# Patient Record
Sex: MALE | Race: OTHER | HISPANIC OR LATINO | Employment: OTHER | URBAN - METROPOLITAN AREA
[De-identification: names, ages, dates, MRNs, and addresses within clinical notes are randomized per-mention and may not be internally consistent; named-entity substitution may affect disease eponyms.]

---

## 2018-07-25 ENCOUNTER — APPOINTMENT (EMERGENCY)
Dept: CT IMAGING | Facility: HOSPITAL | Age: 83
End: 2018-07-25
Payer: MEDICARE

## 2018-07-25 ENCOUNTER — HOSPITAL ENCOUNTER (EMERGENCY)
Facility: HOSPITAL | Age: 83
Discharge: HOME | End: 2018-07-25
Attending: EMERGENCY MEDICINE | Admitting: EMERGENCY MEDICINE
Payer: MEDICARE

## 2018-07-25 VITALS
RESPIRATION RATE: 18 BRPM | HEART RATE: 65 BPM | TEMPERATURE: 98.2 F | SYSTOLIC BLOOD PRESSURE: 129 MMHG | OXYGEN SATURATION: 96 % | DIASTOLIC BLOOD PRESSURE: 69 MMHG

## 2018-07-25 DIAGNOSIS — N40.1 ENLARGED PROSTATE WITH URINARY RETENTION: Primary | ICD-10-CM

## 2018-07-25 DIAGNOSIS — R33.8 ENLARGED PROSTATE WITH URINARY RETENTION: Primary | ICD-10-CM

## 2018-07-25 LAB
ANION GAP SERPL CALCULATED.3IONS-SCNC: 9 MMOL/L (ref 4–13)
BACTERIA UR QL AUTO: ABNORMAL /HPF
BASOPHILS # BLD AUTO: 0.04 THOUSANDS/ΜL (ref 0–0.1)
BASOPHILS NFR BLD AUTO: 0 % (ref 0–1)
BILIRUB UR QL STRIP: NEGATIVE
BUN SERPL-MCNC: 21 MG/DL (ref 5–25)
CALCIUM SERPL-MCNC: 9.3 MG/DL (ref 8.3–10.1)
CHLORIDE SERPL-SCNC: 104 MMOL/L (ref 100–108)
CLARITY UR: CLEAR
CO2 SERPL-SCNC: 29 MMOL/L (ref 21–32)
COLOR UR: YELLOW
CREAT SERPL-MCNC: 1.43 MG/DL (ref 0.6–1.3)
EOSINOPHIL # BLD AUTO: 0.21 THOUSAND/ΜL (ref 0–0.61)
EOSINOPHIL NFR BLD AUTO: 2 % (ref 0–6)
ERYTHROCYTE [DISTWIDTH] IN BLOOD BY AUTOMATED COUNT: 13.5 % (ref 11.6–15.1)
GFR SERPL CREATININE-BSD FRML MDRD: 46 ML/MIN/1.73SQ M
GLUCOSE SERPL-MCNC: 112 MG/DL (ref 65–140)
GLUCOSE UR STRIP-MCNC: NEGATIVE MG/DL
HCT VFR BLD AUTO: 39.4 % (ref 36.5–49.3)
HGB BLD-MCNC: 13 G/DL (ref 12–17)
HGB UR QL STRIP.AUTO: ABNORMAL
IMM GRANULOCYTES # BLD AUTO: 0.02 THOUSAND/UL (ref 0–0.2)
IMM GRANULOCYTES NFR BLD AUTO: 0 % (ref 0–2)
KETONES UR STRIP-MCNC: NEGATIVE MG/DL
LEUKOCYTE ESTERASE UR QL STRIP: NEGATIVE
LYMPHOCYTES # BLD AUTO: 1.38 THOUSANDS/ΜL (ref 0.6–4.47)
LYMPHOCYTES NFR BLD AUTO: 15 % (ref 14–44)
MCH RBC QN AUTO: 27.5 PG (ref 26.8–34.3)
MCHC RBC AUTO-ENTMCNC: 33 G/DL (ref 31.4–37.4)
MCV RBC AUTO: 84 FL (ref 82–98)
MONOCYTES # BLD AUTO: 0.64 THOUSAND/ΜL (ref 0.17–1.22)
MONOCYTES NFR BLD AUTO: 7 % (ref 4–12)
MUCOUS THREADS UR QL AUTO: ABNORMAL
NEUTROPHILS # BLD AUTO: 6.77 THOUSANDS/ΜL (ref 1.85–7.62)
NEUTS SEG NFR BLD AUTO: 76 % (ref 43–75)
NITRITE UR QL STRIP: NEGATIVE
NON-SQ EPI CELLS URNS QL MICRO: ABNORMAL /HPF
NRBC BLD AUTO-RTO: 0 /100 WBCS
PH UR STRIP.AUTO: 5.5 [PH] (ref 4.5–8)
PLATELET # BLD AUTO: 248 THOUSANDS/UL (ref 149–390)
PMV BLD AUTO: 9.6 FL (ref 8.9–12.7)
POTASSIUM SERPL-SCNC: 3.6 MMOL/L (ref 3.5–5.3)
PROT UR STRIP-MCNC: NEGATIVE MG/DL
RBC # BLD AUTO: 4.72 MILLION/UL (ref 3.88–5.62)
RBC #/AREA URNS AUTO: ABNORMAL /HPF
SODIUM SERPL-SCNC: 142 MMOL/L (ref 136–145)
SP GR UR STRIP.AUTO: >=1.03 (ref 1–1.03)
UROBILINOGEN UR QL STRIP.AUTO: 0.2 E.U./DL
WBC # BLD AUTO: 9.06 THOUSAND/UL (ref 4.31–10.16)
WBC #/AREA URNS AUTO: ABNORMAL /HPF

## 2018-07-25 PROCEDURE — 80048 BASIC METABOLIC PNL TOTAL CA: CPT | Performed by: EMERGENCY MEDICINE

## 2018-07-25 PROCEDURE — 99284 EMERGENCY DEPT VISIT MOD MDM: CPT

## 2018-07-25 PROCEDURE — 51798 US URINE CAPACITY MEASURE: CPT

## 2018-07-25 PROCEDURE — 81001 URINALYSIS AUTO W/SCOPE: CPT | Performed by: EMERGENCY MEDICINE

## 2018-07-25 PROCEDURE — 36415 COLL VENOUS BLD VENIPUNCTURE: CPT | Performed by: EMERGENCY MEDICINE

## 2018-07-25 PROCEDURE — 96360 HYDRATION IV INFUSION INIT: CPT

## 2018-07-25 PROCEDURE — 85025 COMPLETE CBC W/AUTO DIFF WBC: CPT | Performed by: EMERGENCY MEDICINE

## 2018-07-25 PROCEDURE — 74177 CT ABD & PELVIS W/CONTRAST: CPT

## 2018-07-25 RX ORDER — TAMSULOSIN HYDROCHLORIDE 0.4 MG/1
0.4 CAPSULE ORAL
Qty: 14 CAPSULE | Refills: 0 | Status: SHIPPED | OUTPATIENT
Start: 2018-07-25 | End: 2019-08-03

## 2018-07-25 RX ORDER — RISPERIDONE 0.5 MG/1
0.5 TABLET, FILM COATED ORAL 2 TIMES DAILY
COMMUNITY
End: 2022-04-04

## 2018-07-25 RX ORDER — HYDROCHLOROTHIAZIDE 25 MG/1
25 TABLET ORAL DAILY
COMMUNITY
End: 2022-04-04

## 2018-07-25 RX ORDER — LISINOPRIL 20 MG/1
20 TABLET ORAL DAILY
COMMUNITY
End: 2022-04-04

## 2018-07-25 RX ORDER — DONEPEZIL HYDROCHLORIDE 5 MG/1
5 TABLET, FILM COATED ORAL
COMMUNITY
End: 2021-08-18

## 2018-07-25 RX ORDER — CHOLECALCIFEROL (VITAMIN D3) 125 MCG
2000 CAPSULE ORAL DAILY
COMMUNITY

## 2018-07-25 RX ORDER — AMLODIPINE BESYLATE 10 MG/1
10 TABLET ORAL DAILY
COMMUNITY

## 2018-07-25 RX ORDER — ASPIRIN 81 MG/1
81 TABLET ORAL DAILY
COMMUNITY

## 2018-07-25 RX ADMIN — SODIUM CHLORIDE 500 ML: 0.9 INJECTION, SOLUTION INTRAVENOUS at 18:30

## 2018-07-25 RX ADMIN — IOHEXOL 100 ML: 350 INJECTION, SOLUTION INTRAVENOUS at 19:46

## 2018-07-25 NOTE — ED PROVIDER NOTES
History  Chief Complaint   Patient presents with    Abdominal Pain     Pt presents to ER via EMS from home, pt has language barrier & no family present upon arrival   Per EMS, pt has been c/o'ing of (R) sided "testicle" pain  Generally healthy appearing frail elderly male presents in no distress  HPI    Prior to Admission Medications   Prescriptions Last Dose Informant Patient Reported? Taking? Cholecalciferol (VITAMIN D3) 2000 units TABS  Child Yes Yes   Sig: Take 2,000 Units by mouth daily   amLODIPine (NORVASC) 10 mg tablet  Child Yes Yes   Sig: Take 10 mg by mouth daily   aspirin (ECOTRIN LOW STRENGTH) 81 mg EC tablet  Child Yes Yes   Sig: Take 81 mg by mouth daily   donepezil (ARICEPT) 5 mg tablet  Child Yes Yes   Sig: Take 5 mg by mouth daily at bedtime   hydrochlorothiazide (HYDRODIURIL) 25 mg tablet  Child Yes Yes   Sig: Take 25 mg by mouth daily   lisinopril (ZESTRIL) 20 mg tablet  Child Yes Yes   Sig: Take 20 mg by mouth daily   risperiDONE (RisperDAL) 0 5 mg tablet  Child Yes Yes   Sig: Take 0 5 mg by mouth 2 (two) times a day      Facility-Administered Medications: None       Past Medical History:   Diagnosis Date    Hypertension        No past surgical history on file  No family history on file  I have reviewed and agree with the history as documented  Social History   Substance Use Topics    Smoking status: Not on file    Smokeless tobacco: Not on file    Alcohol use Not on file        Review of Systems    Physical Exam  Physical Exam   Constitutional: He appears well-developed and well-nourished  No distress  HENT:   Head: Normocephalic and atraumatic  Mouth/Throat: Oropharynx is clear and moist    Eyes: Conjunctivae are normal  Pupils are equal, round, and reactive to light  Neck: Normal range of motion  No tracheal deviation present  Cardiovascular: Normal rate, regular rhythm, normal heart sounds and intact distal pulses      Pulmonary/Chest: Effort normal and breath sounds normal  No respiratory distress  Abdominal: Soft  Bowel sounds are normal  He exhibits no distension  There is tenderness (Mild, occasional, sometimes when palpated) in the right lower quadrant  There is no rigidity, no rebound, no guarding and no CVA tenderness  Hernia confirmed negative in the right inguinal area and confirmed negative in the left inguinal area  Genitourinary: Right testis shows no swelling and no tenderness  Left testis shows no swelling and no tenderness  Lymphadenopathy: No inguinal adenopathy noted on the right or left side  Neurological: He is alert  He is disoriented  GCS eye subscore is 4  GCS verbal subscore is 4  GCS motor subscore is 6  The oriented to self and family  Otherwise confused   Skin: Skin is warm and dry  Psychiatric: He has a normal mood and affect  His behavior is normal    Nursing note and vitals reviewed        Vital Signs  ED Triage Vitals [07/25/18 1627]   Temperature Pulse Respirations Blood Pressure SpO2   98 2 °F (36 8 °C) 67 18 122/72 95 %      Temp Source Heart Rate Source Patient Position - Orthostatic VS BP Location FiO2 (%)   Oral Monitor Lying Right arm --      Pain Score       No Pain           Vitals:    07/25/18 1627   BP: 122/72   Pulse: 67   Patient Position - Orthostatic VS: Lying       Visual Acuity      ED Medications  Medications   sodium chloride 0 9 % bolus 500 mL (500 mL Intravenous New Bag 7/25/18 1830)       Diagnostic Studies  Results Reviewed     Procedure Component Value Units Date/Time    Basic metabolic panel [[id number]]  (Abnormal) Collected:  07/25/18 1740    Lab Status:  Final result Specimen:  Blood from Arm, Right Updated:  07/25/18 1755     Sodium 142 mmol/L      Potassium 3 6 mmol/L      Chloride 104 mmol/L      CO2 29 mmol/L      Anion Gap 9 mmol/L      BUN 21 mg/dL      Creatinine 1 43 (H) mg/dL      Glucose 112 mg/dL      Calcium 9 3 mg/dL      eGFR 46 ml/min/1 73sq m     Narrative:         National Kidney Disease Education Program recommendations are as follows:  GFR calculation is accurate only with a steady state creatinine  Chronic Kidney disease less than 60 ml/min/1 73 sq  meters  Kidney failure less than 15 ml/min/1 73 sq  meters      Urine Microscopic [[id number]]  (Abnormal) Collected:  07/25/18 1731    Lab Status:  Final result Specimen:  Urine from Urine, Clean Catch Updated:  07/25/18 1745     RBC, UA 0-1 (A) /hpf      WBC, UA None Seen /hpf      Epithelial Cells Occasional /hpf      Bacteria, UA None Seen /hpf      MUCOUS THREADS Moderate (A)    CBC and differential [[id number]]  (Abnormal) Collected:  07/25/18 1740    Lab Status:  Final result Specimen:  Blood from Arm, Right Updated:  07/25/18 1744     WBC 9 06 Thousand/uL      RBC 4 72 Million/uL      Hemoglobin 13 0 g/dL      Hematocrit 39 4 %      MCV 84 fL      MCH 27 5 pg      MCHC 33 0 g/dL      RDW 13 5 %      MPV 9 6 fL      Platelets 412 Thousands/uL      nRBC 0 /100 WBCs      Neutrophils Relative 76 (H) %      Immat GRANS % 0 %      Lymphocytes Relative 15 %      Monocytes Relative 7 %      Eosinophils Relative 2 %      Basophils Relative 0 %      Neutrophils Absolute 6 77 Thousands/µL      Immature Grans Absolute 0 02 Thousand/uL      Lymphocytes Absolute 1 38 Thousands/µL      Monocytes Absolute 0 64 Thousand/µL      Eosinophils Absolute 0 21 Thousand/µL      Basophils Absolute 0 04 Thousands/µL     UA w Reflex to Microscopic w Reflex to Culture [[id number]]  (Abnormal) Collected:  07/25/18 1731    Lab Status:  Final result Specimen:  Urine from Urine, Clean Catch Updated:  07/25/18 1737     Color, UA Yellow     Clarity, UA Clear     Specific Gravity, UA >=1 030     pH, UA 5 5     Leukocytes, UA Negative     Nitrite, UA Negative     Protein, UA Negative mg/dl      Glucose, UA Negative mg/dl      Ketones, UA Negative mg/dl      Urobilinogen, UA 0 2 E U /dl      Bilirubin, UA Negative     Blood, UA Trace-Intact (A)                 CT abdomen pelvis with contrast    (Results Pending)              Procedures  Procedures       Phone Contacts  ED Phone Contact    ED Course                               MDM  Number of Diagnoses or Management Options  Diagnosis management comments: This is an 21-year-old male who presents here today with pain  History is severely limited, as the patient has dementia and currently has no complaints, and the daughter states that he is very forgetful and would not remember what was hurting him  Most of the history is provided by the daughter, but she is very vague and keeps changing the story  Due to limited English, the  was used via the [employer]  The daughter states that earlier today the patient was getting out of the shower and was complaining of right groin pain  Later she states that it is his left hip, and when asked to clarify she states that she RD stated it was his right groin  She says he was given Advil and this improved  He was not having any difficulties walking  He has not had any other complaints  He says the Advil helps his pain and he is no longer hurting  She keeps talking about how he had prostate surgery for cancer about 12 years ago  When asks if this has recurred she states he was recently started on a pill for prostate problems, but is uncertain of what or exactly why  He has not had any other abdominal surgeries  She states he sometimes gets a "bump" in his stomach that goes away, but he does not complain of pain with this  The patient and his daughter currently deny any redness, swelling, rashes, complaints of nausea, vomiting, diarrhea, dysuria or other urinary symptoms  He denies any recent falls or trauma  The patient has no complaints and is not certain why he is here  He is also not sure where he currently is  ROS: Otherwise negative, unless stated as above  He is well-appearing, in no acute distress   Sometimes, he complains of mild tenderness with palpation to the right lower quadrant, but not every time this area is palpated  There is no groin tenderness, scrotal tenderness, hernias, visible erythema or rashes to this area  There is no hip tenderness or pain with range of motion of either hip  The remainder of his exam is unremarkable  Given the vague history and inconsistent exam, is uncertain of exactly where he is hurting or what his underlying problem is  This could be either primary intra-abdominal pathology, scrotal or testicular problem, UTI  Given this, and his history of prostate cancer, we will check lab work, his urine, and CT scan to evaluate  His lab work is unremarkable  His CT scan is still pending  Care patient transferred to Dr Eugenio Heart to follow-up on CT scan and disposition as appropriate  If there are no acute abnormalities on the CT scan, he is stable for discharge home for follow up with his primary care doctor  Amount and/or Complexity of Data Reviewed  Clinical lab tests: ordered and reviewed  Tests in the radiology section of CPT®: ordered and reviewed  Obtain history from someone other than the patient: yes (Daughter)  Independent visualization of images, tracings, or specimens: yes      CritCare Time    Disposition  Final diagnoses:   None     ED Disposition     None      Follow-up Information    None         Patient's Medications   Discharge Prescriptions    No medications on file     No discharge procedures on file      ED Provider  Electronically Signed by           Rod Bettencourt MD  07/25/18 4699

## 2018-07-26 NOTE — DISCHARGE INSTRUCTIONS
Hipertrofia prostática benigna   LO QUE NECESITA SABER:   La hipertrofia prostática benigna (HPB) es arias condición que provoca que la glándula prostática crezca mas zachariah de lo normal  La glándula prostática es la glándula sexual masculina que produce el líquido que es parte del semen  Tiene el tamaño aproximadamente de [place] y está localizado debajo de la vejiga  Conforme la próstata crece, puede apretar la uretra  [place] puede obstruir el flujo de [place] y provocar problemas urinarios  INSTRUCCIONES SOBRE EL [facility]:   Medicamentos:   · Bloqueante savage:  Estos medicamentos relajan los músculos de la próstata y de la vejiga  Podrían ayudarlo a orinar con mas facilidad  · Inhibidores de la 5 savgae reductasa:  Estos medicamentos obstruyen la producción de raias hormona que provoca que la próstata se agrande mas  Podrían ayudar a disminuir el crecimiento de la próstata o a encogerla  · [place] carlene medicamentos ellie se le haya indicado  Consulte con macias médico si usted denzel que macias medicamento no le está ayudando o si presenta efectos secundarios  Infórmele si es alérgico a algún medicamento  Mantenga arias lista actualizada de los [place], las vitaminas y los productos herbales que romero  Incluya los siguientes datos de los medicamentos: cantidad, frecuencia y motivo de administración  Traiga con usted la lista o los envases de la píldoras a carlene citas de seguimiento  Lleve la lista de los medicamentos con usted en sheryl de arias emergencia  Acuda a carlene consultas de control con macias médico según le indicaron  Anote carlene preguntas para que se acuerde de hacerlas elenita carlene visitas  Controle la HPB:   · No permita que macias vejiga se llene demasiado antes de vaciarla  Orine cuando sienta el impulso de [place]  · Limite el consumo de alcohol  No ingiera grandes cantidades de líquidos de [employer]  · Brianafurt cantidad de sal que consume   Por ejemplo, las toni fritas, las alan curadas y las sopas enlatadas  No use sal de lucero  · Los médicos podrían indicarle que no consuma alimentos picantes, ellie ASKER  [place] podría ayudarlo a determinar si la comida picante empeora los síntomas de HPB  · Usted puede tener relaciones sexuales si se siente vargas  Pregúntele a rodriguez [place] vitaminas y minerales son adecuados para usted  · Hay arias cantidad zachariah de serafin en la orina  · Viviane signos y síntomas empeoran  · Usted tiene fiebre  · Usted tiene preguntas o inquietudes acerca de rodriguez condición o cuidado  Regrese a la magno de emergencias si:   · Usted no puede orinar  · Siente que la vejiga está muy llena y tiene dolor  © 2017 [address] Information is for End User's use only and may not be sold, redistributed or otherwise used for commercial purposes  All illustrations and images included in CareNotes® are the copyrighted property of A D A M , Inc  or Kirill Dean  Esta información es sólo para uso en educación  Rodriguez intención no es darle un consejo médico sobre enfermedades o tratamientos  Colsulte con rodriguez Kacie Jerry farmacéutico antes de seguir cualquier régimen médico para saber si es seguro y efectivo para usted

## 2018-09-13 ENCOUNTER — TELEPHONE (OUTPATIENT)
Dept: UROLOGY | Facility: MEDICAL CENTER | Age: 83
End: 2018-09-13

## 2019-02-20 ENCOUNTER — HOSPITAL ENCOUNTER (EMERGENCY)
Facility: HOSPITAL | Age: 84
Discharge: HOME | End: 2019-02-21
Attending: EMERGENCY MEDICINE | Admitting: EMERGENCY MEDICINE
Payer: MEDICARE

## 2019-02-20 DIAGNOSIS — M54.50 ACUTE LOW BACK PAIN: Primary | ICD-10-CM

## 2019-02-20 DIAGNOSIS — N40.0 ENLARGED PROSTATE: ICD-10-CM

## 2019-02-20 DIAGNOSIS — M54.9 BACK PAIN: ICD-10-CM

## 2019-02-20 LAB
BASOPHILS # BLD AUTO: 0.02 THOUSANDS/ΜL (ref 0–0.1)
BASOPHILS NFR BLD AUTO: 0 % (ref 0–1)
EOSINOPHIL # BLD AUTO: 0.01 THOUSAND/ΜL (ref 0–0.61)
EOSINOPHIL NFR BLD AUTO: 0 % (ref 0–6)
ERYTHROCYTE [DISTWIDTH] IN BLOOD BY AUTOMATED COUNT: 13.7 % (ref 11.6–15.1)
HCT VFR BLD AUTO: 40.4 % (ref 36.5–49.3)
HGB BLD-MCNC: 13.3 G/DL (ref 12–17)
IMM GRANULOCYTES # BLD AUTO: 0.09 THOUSAND/UL (ref 0–0.2)
IMM GRANULOCYTES NFR BLD AUTO: 1 % (ref 0–2)
LYMPHOCYTES # BLD AUTO: 1.11 THOUSANDS/ΜL (ref 0.6–4.47)
LYMPHOCYTES NFR BLD AUTO: 6 % (ref 14–44)
MCH RBC QN AUTO: 27 PG (ref 26.8–34.3)
MCHC RBC AUTO-ENTMCNC: 32.9 G/DL (ref 31.4–37.4)
MCV RBC AUTO: 82 FL (ref 82–98)
MONOCYTES # BLD AUTO: 1.23 THOUSAND/ΜL (ref 0.17–1.22)
MONOCYTES NFR BLD AUTO: 7 % (ref 4–12)
NEUTROPHILS # BLD AUTO: 14.9 THOUSANDS/ΜL (ref 1.85–7.62)
NEUTS SEG NFR BLD AUTO: 86 % (ref 43–75)
NRBC BLD AUTO-RTO: 0 /100 WBCS
PLATELET # BLD AUTO: 282 THOUSANDS/UL (ref 149–390)
PMV BLD AUTO: 10.7 FL (ref 8.9–12.7)
RBC # BLD AUTO: 4.92 MILLION/UL (ref 3.88–5.62)
WBC # BLD AUTO: 17.36 THOUSAND/UL (ref 4.31–10.16)

## 2019-02-20 PROCEDURE — 99284 EMERGENCY DEPT VISIT MOD MDM: CPT

## 2019-02-20 PROCEDURE — 96374 THER/PROPH/DIAG INJ IV PUSH: CPT

## 2019-02-20 PROCEDURE — 36415 COLL VENOUS BLD VENIPUNCTURE: CPT | Performed by: EMERGENCY MEDICINE

## 2019-02-20 PROCEDURE — 83690 ASSAY OF LIPASE: CPT | Performed by: EMERGENCY MEDICINE

## 2019-02-20 PROCEDURE — 83605 ASSAY OF LACTIC ACID: CPT | Performed by: EMERGENCY MEDICINE

## 2019-02-20 PROCEDURE — 85025 COMPLETE CBC W/AUTO DIFF WBC: CPT | Performed by: EMERGENCY MEDICINE

## 2019-02-20 PROCEDURE — 80053 COMPREHEN METABOLIC PANEL: CPT | Performed by: EMERGENCY MEDICINE

## 2019-02-20 PROCEDURE — 84484 ASSAY OF TROPONIN QUANT: CPT | Performed by: EMERGENCY MEDICINE

## 2019-02-20 RX ORDER — ACETAMINOPHEN 325 MG/1
975 TABLET ORAL ONCE
Status: COMPLETED | OUTPATIENT
Start: 2019-02-21 | End: 2019-02-20

## 2019-02-20 RX ORDER — ONDANSETRON 2 MG/ML
4 INJECTION INTRAMUSCULAR; INTRAVENOUS ONCE
Status: COMPLETED | OUTPATIENT
Start: 2019-02-20 | End: 2019-02-20

## 2019-02-20 RX ADMIN — ONDANSETRON 4 MG: 2 INJECTION INTRAMUSCULAR; INTRAVENOUS at 23:43

## 2019-02-20 RX ADMIN — ACETAMINOPHEN 975 MG: 325 TABLET, FILM COATED ORAL at 23:57

## 2019-02-21 ENCOUNTER — APPOINTMENT (EMERGENCY)
Dept: CT IMAGING | Facility: HOSPITAL | Age: 84
End: 2019-02-21
Payer: MEDICARE

## 2019-02-21 VITALS
HEIGHT: 65 IN | OXYGEN SATURATION: 95 % | DIASTOLIC BLOOD PRESSURE: 81 MMHG | SYSTOLIC BLOOD PRESSURE: 142 MMHG | TEMPERATURE: 98.2 F | BODY MASS INDEX: 28.49 KG/M2 | RESPIRATION RATE: 20 BRPM | WEIGHT: 171 LBS | HEART RATE: 91 BPM

## 2019-02-21 LAB
ALBUMIN SERPL BCP-MCNC: 4 G/DL (ref 3.5–5)
ALP SERPL-CCNC: 81 U/L (ref 46–116)
ALT SERPL W P-5'-P-CCNC: 18 U/L (ref 12–78)
ANION GAP SERPL CALCULATED.3IONS-SCNC: 12 MMOL/L (ref 4–13)
AST SERPL W P-5'-P-CCNC: 24 U/L (ref 5–45)
ATRIAL RATE: 81 BPM
BACTERIA UR QL AUTO: ABNORMAL /HPF
BILIRUB SERPL-MCNC: 0.5 MG/DL (ref 0.2–1)
BILIRUB UR QL STRIP: NEGATIVE
BUN SERPL-MCNC: 16 MG/DL (ref 5–25)
CALCIUM SERPL-MCNC: 9.9 MG/DL (ref 8.3–10.1)
CHLORIDE SERPL-SCNC: 96 MMOL/L (ref 100–108)
CLARITY UR: CLEAR
CO2 SERPL-SCNC: 28 MMOL/L (ref 21–32)
COLOR UR: YELLOW
CREAT SERPL-MCNC: 1.33 MG/DL (ref 0.6–1.3)
GFR SERPL CREATININE-BSD FRML MDRD: 49 ML/MIN/1.73SQ M
GLUCOSE SERPL-MCNC: 173 MG/DL (ref 65–140)
GLUCOSE UR STRIP-MCNC: NEGATIVE MG/DL
HGB UR QL STRIP.AUTO: ABNORMAL
KETONES UR STRIP-MCNC: ABNORMAL MG/DL
LACTATE SERPL-SCNC: 1.6 MMOL/L (ref 0.5–2)
LEUKOCYTE ESTERASE UR QL STRIP: ABNORMAL
LIPASE SERPL-CCNC: 162 U/L (ref 73–393)
MUCOUS THREADS UR QL AUTO: ABNORMAL
NITRITE UR QL STRIP: NEGATIVE
NON-SQ EPI CELLS URNS QL MICRO: ABNORMAL /HPF
P AXIS: 37 DEGREES
PH UR STRIP.AUTO: 5.5 [PH] (ref 4.5–8)
POTASSIUM SERPL-SCNC: 3.5 MMOL/L (ref 3.5–5.3)
PR INTERVAL: 178 MS
PROT SERPL-MCNC: 9.2 G/DL (ref 6.4–8.2)
PROT UR STRIP-MCNC: NEGATIVE MG/DL
QRS AXIS: -33 DEGREES
QRSD INTERVAL: 92 MS
QT INTERVAL: 372 MS
QTC INTERVAL: 432 MS
RBC #/AREA URNS AUTO: ABNORMAL /HPF
SODIUM SERPL-SCNC: 136 MMOL/L (ref 136–145)
SP GR UR STRIP.AUTO: >=1.03 (ref 1–1.03)
T WAVE AXIS: 56 DEGREES
TROPONIN I SERPL-MCNC: <0.02 NG/ML
UROBILINOGEN UR QL STRIP.AUTO: 0.2 E.U./DL
VENTRICULAR RATE: 81 BPM
WBC #/AREA URNS AUTO: ABNORMAL /HPF

## 2019-02-21 PROCEDURE — 71260 CT THORAX DX C+: CPT

## 2019-02-21 PROCEDURE — 93005 ELECTROCARDIOGRAM TRACING: CPT

## 2019-02-21 PROCEDURE — 93010 ELECTROCARDIOGRAM REPORT: CPT | Performed by: INTERNAL MEDICINE

## 2019-02-21 PROCEDURE — 81001 URINALYSIS AUTO W/SCOPE: CPT | Performed by: EMERGENCY MEDICINE

## 2019-02-21 PROCEDURE — 74177 CT ABD & PELVIS W/CONTRAST: CPT

## 2019-02-21 RX ORDER — LIDOCAINE 50 MG/G
1 PATCH TOPICAL DAILY
Qty: 30 PATCH | Refills: 0 | Status: SHIPPED | OUTPATIENT
Start: 2019-02-21 | End: 2021-08-18

## 2019-02-21 RX ORDER — LIDOCAINE 50 MG/G
2 PATCH TOPICAL ONCE
Status: DISCONTINUED | OUTPATIENT
Start: 2019-02-21 | End: 2019-02-21 | Stop reason: HOSPADM

## 2019-02-21 RX ADMIN — IOHEXOL 100 ML: 350 INJECTION, SOLUTION INTRAVENOUS at 00:51

## 2019-02-21 RX ADMIN — LIDOCAINE 2 PATCH: 50 PATCH TOPICAL at 01:43

## 2019-02-21 NOTE — ED PROVIDER NOTES
History  Chief Complaint   Patient presents with    Back Pain     pt brought in by EMS for back pain and nausea; vomited food particles when the EMS arrived  History and examination completed with use of phone   Patient has severe dementia and does not appear to recall much of the history  Additional history provided by patient's family through the   53-year-old male presents with a reported 1 day of lower back or flank pain  Patient currently denies any back or flank pain  Per EMS, patient was noted to be nauseous and subsequently had 3 episodes of nonbloody, nonbilious en route to the emergency room  IV was established but they were unable to initiate ondansetron prior to arrival     Patient does not provide significant information regarding his history, he does affirm persistent nauseousness and recalls his vomiting  He does not recall any back pain or flank pain  Is unclear which side the patient noted pain as EMS states family noted right and presently they think it may have been left  Patient denies any pain presently  On review of systems, patient does note a loose tooth but denies any pain in the area  Patient and family deny any falls or trauma  They deny any recent illnesses  They deny any loss of bowel or bladder  The note patient has a history of prostatomegaly with remote history of prostate cancer  Patient takes tamsulosin for continued prostatomegaly  Impression and plan:  Back or flank pain with associated vomiting representing a broad differential   Medical decision making is complicated by patient's underlying dementia and limited information available from the patient's family  Patient currently denies any pain, notes continued nausea    Will treat patient symptomatically with ondansetron, obtain laboratory evaluation and CT imaging of patient's abdomen pelvis to evaluate for intra-abdominal pathology including potential for renal colic or occult fracture  Prior to Admission Medications   Prescriptions Last Dose Informant Patient Reported? Taking? Cholecalciferol (VITAMIN D3) 2000 units TABS  Child Yes No   Sig: Take 2,000 Units by mouth daily   amLODIPine (NORVASC) 10 mg tablet  Child Yes No   Sig: Take 10 mg by mouth daily   aspirin (ECOTRIN LOW STRENGTH) 81 mg EC tablet  Child Yes No   Sig: Take 81 mg by mouth daily   donepezil (ARICEPT) 5 mg tablet  Child Yes No   Sig: Take 5 mg by mouth daily at bedtime   hydrochlorothiazide (HYDRODIURIL) 25 mg tablet  Child Yes No   Sig: Take 25 mg by mouth daily   lisinopril (ZESTRIL) 20 mg tablet  Child Yes No   Sig: Take 20 mg by mouth daily   risperiDONE (RisperDAL) 0 5 mg tablet  Child Yes No   Sig: Take 0 5 mg by mouth 2 (two) times a day   tamsulosin (FLOMAX) 0 4 mg   No No   Sig: Take 1 capsule (0 4 mg total) by mouth daily with dinner for 14 days      Facility-Administered Medications: None       Past Medical History:   Diagnosis Date    Diabetes mellitus ([facility] )     Hypertension     Prostate CA ([facility] )        History reviewed  No pertinent surgical history  History reviewed  No pertinent family history  I have reviewed and agree with the history as documented  Social History     Tobacco Use    Smoking status: Never Smoker    Smokeless tobacco: Never Used   Substance Use Topics    Alcohol use: Never     Frequency: Never    Drug use: Not on file        Review of Systems    Physical Exam  Physical Exam   Constitutional: He appears well-developed and well-nourished  No distress  HENT:   Head: Normocephalic and atraumatic  Mouth/Throat: Oropharynx is clear and moist    Poor dentition with likely lower right incisor appears loose but intact  The there is no tooth on either side to secure the patient's tooth  Eyes: Pupils are equal, round, and reactive to light  Neck: Normal range of motion  Neck supple  Cardiovascular: Normal rate and regular rhythm     Pulmonary/Chest: Effort normal and breath sounds normal  No stridor  No respiratory distress  He has no wheezes  He has no rales  Abdominal: Soft  Bowel sounds are normal  He exhibits no distension and no mass  There is no tenderness  There is no rebound and no guarding  Musculoskeletal: He exhibits no tenderness  Back: Normal inspection without scoliosis or hyperkyphosis  ROM of spine is normal  Patient notes pain located in no discrete area, appears to migratory  Tenderness to palpation in no discrete location  Normal stand (S1) and sit (S3)  Gait is normal without assistance  Reflexes: patellar (L4) and ankle (S1) normal  Passive RoM of the knee without pain  Normal resistance dorsiflex great toes bilaterally (L5)  Sensation normal on the legs including the anterior medial thigh (L2), medial epicondyle femur (L3), medial malleolus (L4), dorsal 3rd MTP (L5), lateral calcaneus (S1), popliteal fossa (S2)  Normal SLR and cross leg raise  Neurological: He is alert  Skin: Skin is warm and dry  He is not diaphoretic  Psychiatric: Cognition and memory are impaired  Vitals reviewed        Vital Signs  ED Triage Vitals   Temperature Pulse Respirations Blood Pressure SpO2   02/20/19 2332 02/20/19 2332 02/20/19 2332 02/20/19 2330 02/20/19 2332   98 2 °F (36 8 °C) 82 18 148/85 95 %      Temp Source Heart Rate Source Patient Position - Orthostatic VS BP Location FiO2 (%)   02/20/19 2332 02/20/19 2332 02/20/19 2332 02/20/19 2332 --   Oral Monitor Lying Right arm       Pain Score       02/20/19 2332       No Pain           Vitals:    02/20/19 2330 02/20/19 2332 02/21/19 0015   BP: 148/85 148/85 142/81   Pulse:  82 91   Patient Position - Orthostatic VS:  Lying        Visual Acuity      ED Medications  Medications   lidocaine (LIDODERM) 5 % patch 2 patch (2 patches Topical Medication Applied 2/21/19 0143)   ondansetron (ZOFRAN) injection 4 mg (4 mg Intravenous Given 2/20/19 2343)   acetaminophen (TYLENOL) tablet 975 mg (975 mg Oral Given 2/20/19 2357)   iohexol (OMNIPAQUE) 350 MG/ML injection (SINGLE-DOSE) 100 mL (100 mL Intravenous Given 2/21/19 0051)       Diagnostic Studies  Results Reviewed     Procedure Component Value Units Date/Time    Urine Microscopic [[id number]]  (Abnormal) Collected:  02/21/19 0052    Lab Status:  Final result Specimen:  Urine, Clean Catch Updated:  02/21/19 0109     RBC, UA 1-2 /hpf      WBC, UA 2-4 /hpf      Epithelial Cells Occasional /hpf      Bacteria, UA None Seen /hpf      MUCUS THREADS Moderate    UA w Reflex to Microscopic w Reflex to Culture [[id number]]  (Abnormal) Collected:  02/21/19 0052    Lab Status:  Final result Specimen:  Urine, Clean Catch Updated:  02/21/19 0057     Color, UA Yellow     Clarity, UA Clear     Specific Gravity, UA >=1 030     pH, UA 5 5     Leukocytes, UA Trace     Nitrite, UA Negative     Protein, UA Negative mg/dl      Glucose, UA Negative mg/dl      Ketones, UA Trace mg/dl      Urobilinogen, UA 0 2 E U /dl      Bilirubin, UA Negative     Blood, UA Small    Lactic acid, plasma [[id number]]  (Normal) Collected:  02/20/19 2350    Lab Status:  Final result Specimen:  Blood from Arm, Left Updated:  02/21/19 0020     LACTIC ACID 1 6 mmol/L     Narrative:       Result may be elevated if tourniquet was used during collection      Troponin I [[id number]]  (Normal) Collected:  02/20/19 2350    Lab Status:  Final result Specimen:  Blood from Arm, Left Updated:  02/21/19 0020     Troponin I <0 02 ng/mL     CMP [[id number]]  (Abnormal) Collected:  02/20/19 2350    Lab Status:  Final result Specimen:  Blood from Arm, Left Updated:  02/21/19 0014     Sodium 136 mmol/L      Potassium 3 5 mmol/L      Chloride 96 mmol/L      CO2 28 mmol/L      ANION GAP 12 mmol/L      BUN 16 mg/dL      Creatinine 1 33 mg/dL      Glucose 173 mg/dL      Calcium 9 9 mg/dL      AST 24 U/L      ALT 18 U/L      Alkaline Phosphatase 81 U/L      Total Protein 9 2 g/dL      Albumin 4 0 g/dL      Total Bilirubin 0 50 mg/dL      eGFR 49 ml/min/1 73sq m     Narrative:       National Kidney Disease Education Program recommendations are as follows:  GFR calculation is accurate only with a steady state creatinine  Chronic Kidney disease less than 60 ml/min/1 73 sq  meters  Kidney failure less than 15 ml/min/1 73 sq  meters  Lipase [[id number]]  (Normal) Collected:  02/20/19 2350    Lab Status:  Final result Specimen:  Blood from Arm, Left Updated:  02/21/19 0014     Lipase 162 u/L     CBC and differential [[id number]]  (Abnormal) Collected:  02/20/19 2350    Lab Status:  Final result Specimen:  Blood from Arm, Left Updated:  02/20/19 2359     WBC 17 36 Thousand/uL      RBC 4 92 Million/uL      Hemoglobin 13 3 g/dL      Hematocrit 40 4 %      MCV 82 fL      MCH 27 0 pg      MCHC 32 9 g/dL      RDW 13 7 %      MPV 10 7 fL      Platelets 747 Thousands/uL      nRBC 0 /100 WBCs      Neutrophils Relative 86 %      Immat GRANS % 1 %      Lymphocytes Relative 6 %      Monocytes Relative 7 %      Eosinophils Relative 0 %      Basophils Relative 0 %      Neutrophils Absolute 14 90 Thousands/µL      Immature Grans Absolute 0 09 Thousand/uL      Lymphocytes Absolute 1 11 Thousands/µL      Monocytes Absolute 1 23 Thousand/µL      Eosinophils Absolute 0 01 Thousand/µL      Basophils Absolute 0 02 Thousands/µL                  CT chest abdomen pelvis w contrast   Final Result by Radha Garrison MD (02/21 0105)      1  Marked thickening of the urinary bladder wall, correlate with urinalysis for cystitis  2   Enlarged prostate  3   Coronary artery calcifications  4   Stable 1 5 cm indeterminate adrenal nodule  5   Small hiatal hernia  Workstation performed: [id number]         CT recon only thoracolumbar   Final Result by Radha Garrison MD (02/21 0105)      No acute fracture or traumatic subluxation        Workstation performed: [id number]                    Procedures  Procedures       Phone Contacts  ED Phone Contact    ED Course  ED Course as of Feb 21 2374   Thu Feb 21, 2019   0025 EKG demonstrates normal sinus rhythm with no acute ST segment changes  1564 Could be secondary to vomiting, awaiting urinalysis and CT imaging to evaluate for potential source of infection  WBC(!): 17 36   0126 Patient's CT without any acute findings  Patient has known prostatomegaly  Patient for cystitis with no bacteria seen on urinalysis and patient again denies any dysuria or other urinary symptoms making this less likely  Patient afebrile, moderate leukocytosis that may be secondary to vomiting  Will p o  Challenge patient and attempted ambulation  Patient states he does not use a cane or walker at home  0134 Patient urinated without difficulty in the emergency room  0150 Again confirmed patient has no signs or symptoms of cauda equina  I discussed these in detail with the patient and his family  Patient's grandson speak English, explained the symptoms to him and I discussed with the patient's daughter on the  line are discharge in detail  I discussed follow-up and return precautions in detail  Discussed continued symptomatic management  Discussed signs and symptoms or returning to emergency room immediately  Discussed contacting primary care tomorrow for follow-up and reassessment  Discussed return to emergency room with any inability to ambulate  Patient has been ambulating throughout the emergency room with no difficulty and without assistance  Patient tolerated oral intake no additional episodes of vomiting  Discussed that I have no clear etiology for the symptoms and to have a low threshold to return to emergency room and to follow closely with primary care  Patient and family in agreement with this plan  Patient asymptomatic upon discharge the emergency room                                    MDM    Disposition  Final diagnoses:   Acute low back pain   Enlarged prostate   Back pain     Time reflects when diagnosis was documented in both MDM as applicable and the Disposition within this note     Time User Action Codes Description Comment    2/21/2019  1:34 AM Jethro Hind Add [M54 5] Acute low back pain     2/21/2019  1:49 AM Jethro Hind Add [N40 0] Enlarged prostate     2/21/2019  1:49 AM Jethro Hind Add [M54 9] Back pain       ED Disposition     ED Disposition Condition Date/Time Comment    Discharge Stable Thu Feb 21, 2019  1:49 AM Taylor Barclay discharge to home/self care  Follow-up Information     Follow up With Specialties Details Why Contact Info Additional Information    Dearchio Hylton PA-C Physician Assistant Schedule an appointment as soon as possible for a visit in 3 days Follow up and reassessment   [address] [facility] Emergency Department Emergency Medicine Go to  If symptoms worsen [address] [facility] [address] [phone] [place] [facility], [address] [place], [place], [address]          Discharge Medication List as of 2/21/2019  1:50 AM      START taking these medications    Details   lidocaine (LIDODERM) 5 % Apply 1 patch topically daily Remove & Discard patch within 12 hours or as directed by MD, Starting Thu 2/21/2019, Print         CONTINUE these medications which have NOT CHANGED    Details   amLODIPine (NORVASC) 10 mg tablet Take 10 mg by mouth daily, Historical Med      aspirin (ECOTRIN LOW STRENGTH) 81 mg EC tablet Take 81 mg by mouth daily, Historical Med      Cholecalciferol (VITAMIN D3) 2000 units TABS Take 2,000 Units by mouth daily, Historical Med      donepezil (ARICEPT) 5 mg tablet Take 5 mg by mouth daily at bedtime, Historical Med      hydrochlorothiazide (HYDRODIURIL) 25 mg tablet Take 25 mg by mouth daily, Historical Med      lisinopril (ZESTRIL) 20 mg tablet Take 20 mg by mouth daily, Historical Med      risperiDONE (RisperDAL) 0 5 mg tablet Take 0 5 mg by mouth 2 (two) times a day, Historical Med      tamsulosin (FLOMAX) 0 4 mg Take 1 capsule (0 4 mg total) by mouth daily with dinner for 14 days, Starting Wed 7/25/2018, Until Wed 8/8/2018, Print           No discharge procedures on file      ED Provider  Electronically Signed by           Kortney Angel MD  02/21/19 7270

## 2019-08-03 ENCOUNTER — APPOINTMENT (EMERGENCY)
Dept: CT IMAGING | Facility: HOSPITAL | Age: 84
End: 2019-08-03
Payer: COMMERCIAL

## 2019-08-03 ENCOUNTER — HOSPITAL ENCOUNTER (EMERGENCY)
Facility: HOSPITAL | Age: 84
Discharge: HOME | End: 2019-08-03
Attending: EMERGENCY MEDICINE | Admitting: EMERGENCY MEDICINE
Payer: COMMERCIAL

## 2019-08-03 ENCOUNTER — APPOINTMENT (EMERGENCY)
Dept: RADIOLOGY | Facility: HOSPITAL | Age: 84
End: 2019-08-03
Payer: COMMERCIAL

## 2019-08-03 VITALS
DIASTOLIC BLOOD PRESSURE: 69 MMHG | TEMPERATURE: 98.7 F | RESPIRATION RATE: 17 BRPM | HEART RATE: 87 BPM | SYSTOLIC BLOOD PRESSURE: 117 MMHG | HEIGHT: 65 IN | BODY MASS INDEX: 25.45 KG/M2 | OXYGEN SATURATION: 94 % | WEIGHT: 152.78 LBS

## 2019-08-03 DIAGNOSIS — R07.9 CHEST PAIN: Primary | ICD-10-CM

## 2019-08-03 LAB
ALBUMIN SERPL BCP-MCNC: 3.8 G/DL (ref 3.5–5)
ALP SERPL-CCNC: 92 U/L (ref 46–116)
ALT SERPL W P-5'-P-CCNC: 14 U/L (ref 12–78)
ANION GAP SERPL CALCULATED.3IONS-SCNC: 10 MMOL/L (ref 4–13)
AST SERPL W P-5'-P-CCNC: 17 U/L (ref 5–45)
ATRIAL RATE: 77 BPM
BASOPHILS # BLD AUTO: 0.02 THOUSANDS/ΜL (ref 0–0.1)
BASOPHILS NFR BLD AUTO: 0 % (ref 0–1)
BILIRUB SERPL-MCNC: 0.4 MG/DL (ref 0.2–1)
BUN SERPL-MCNC: 27 MG/DL (ref 5–25)
CALCIUM SERPL-MCNC: 10 MG/DL (ref 8.3–10.1)
CHLORIDE SERPL-SCNC: 99 MMOL/L (ref 100–108)
CO2 SERPL-SCNC: 32 MMOL/L (ref 21–32)
CREAT SERPL-MCNC: 1.53 MG/DL (ref 0.6–1.3)
EOSINOPHIL # BLD AUTO: 0.22 THOUSAND/ΜL (ref 0–0.61)
EOSINOPHIL NFR BLD AUTO: 2 % (ref 0–6)
ERYTHROCYTE [DISTWIDTH] IN BLOOD BY AUTOMATED COUNT: 14.2 % (ref 11.6–15.1)
GFR SERPL CREATININE-BSD FRML MDRD: 42 ML/MIN/1.73SQ M
GLUCOSE SERPL-MCNC: 151 MG/DL (ref 65–140)
HCT VFR BLD AUTO: 35.4 % (ref 36.5–49.3)
HGB BLD-MCNC: 11.6 G/DL (ref 12–17)
IMM GRANULOCYTES # BLD AUTO: 0.03 THOUSAND/UL (ref 0–0.2)
IMM GRANULOCYTES NFR BLD AUTO: 0 % (ref 0–2)
LIPASE SERPL-CCNC: 137 U/L (ref 73–393)
LYMPHOCYTES # BLD AUTO: 2.16 THOUSANDS/ΜL (ref 0.6–4.47)
LYMPHOCYTES NFR BLD AUTO: 23 % (ref 14–44)
MCH RBC QN AUTO: 26.8 PG (ref 26.8–34.3)
MCHC RBC AUTO-ENTMCNC: 32.8 G/DL (ref 31.4–37.4)
MCV RBC AUTO: 82 FL (ref 82–98)
MONOCYTES # BLD AUTO: 0.81 THOUSAND/ΜL (ref 0.17–1.22)
MONOCYTES NFR BLD AUTO: 9 % (ref 4–12)
NEUTROPHILS # BLD AUTO: 6.27 THOUSANDS/ΜL (ref 1.85–7.62)
NEUTS SEG NFR BLD AUTO: 66 % (ref 43–75)
NRBC BLD AUTO-RTO: 0 /100 WBCS
P AXIS: 51 DEGREES
PLATELET # BLD AUTO: 297 THOUSANDS/UL (ref 149–390)
PMV BLD AUTO: 10.1 FL (ref 8.9–12.7)
POTASSIUM SERPL-SCNC: 3.1 MMOL/L (ref 3.5–5.3)
PR INTERVAL: 192 MS
PROT SERPL-MCNC: 9.1 G/DL (ref 6.4–8.2)
QRS AXIS: 173 DEGREES
QRSD INTERVAL: 86 MS
QT INTERVAL: 394 MS
QTC INTERVAL: 445 MS
RBC # BLD AUTO: 4.33 MILLION/UL (ref 3.88–5.62)
SODIUM SERPL-SCNC: 141 MMOL/L (ref 136–145)
T WAVE AXIS: 45 DEGREES
TROPONIN I SERPL-MCNC: <0.02 NG/ML
VENTRICULAR RATE: 77 BPM
WBC # BLD AUTO: 9.51 THOUSAND/UL (ref 4.31–10.16)

## 2019-08-03 PROCEDURE — 99285 EMERGENCY DEPT VISIT HI MDM: CPT

## 2019-08-03 PROCEDURE — 80053 COMPREHEN METABOLIC PANEL: CPT | Performed by: PHYSICIAN ASSISTANT

## 2019-08-03 PROCEDURE — 99284 EMERGENCY DEPT VISIT MOD MDM: CPT | Performed by: EMERGENCY MEDICINE

## 2019-08-03 PROCEDURE — 85025 COMPLETE CBC W/AUTO DIFF WBC: CPT | Performed by: PHYSICIAN ASSISTANT

## 2019-08-03 PROCEDURE — 71046 X-RAY EXAM CHEST 2 VIEWS: CPT

## 2019-08-03 PROCEDURE — 96360 HYDRATION IV INFUSION INIT: CPT

## 2019-08-03 PROCEDURE — 36415 COLL VENOUS BLD VENIPUNCTURE: CPT | Performed by: PHYSICIAN ASSISTANT

## 2019-08-03 PROCEDURE — 96361 HYDRATE IV INFUSION ADD-ON: CPT

## 2019-08-03 PROCEDURE — 93005 ELECTROCARDIOGRAM TRACING: CPT

## 2019-08-03 PROCEDURE — 93010 ELECTROCARDIOGRAM REPORT: CPT | Performed by: INTERNAL MEDICINE

## 2019-08-03 PROCEDURE — 71275 CT ANGIOGRAPHY CHEST: CPT

## 2019-08-03 PROCEDURE — 74175 CTA ABDOMEN W/CONTRAST: CPT

## 2019-08-03 PROCEDURE — 83690 ASSAY OF LIPASE: CPT | Performed by: PHYSICIAN ASSISTANT

## 2019-08-03 PROCEDURE — 84484 ASSAY OF TROPONIN QUANT: CPT | Performed by: PHYSICIAN ASSISTANT

## 2019-08-03 RX ORDER — 0.9 % SODIUM CHLORIDE 0.9 %
3 VIAL (ML) INJECTION AS NEEDED
Status: DISCONTINUED | OUTPATIENT
Start: 2019-08-03 | End: 2019-08-03 | Stop reason: HOSPADM

## 2019-08-03 RX ADMIN — IOHEXOL 100 ML: 350 INJECTION, SOLUTION INTRAVENOUS at 02:48

## 2019-08-03 RX ADMIN — SODIUM CHLORIDE 1000 ML: 0.9 INJECTION, SOLUTION INTRAVENOUS at 01:51

## 2019-08-03 NOTE — ED PROVIDER NOTES
History  Chief Complaint   Patient presents with    Abdominal Pain     pt brought in for EMS for chest pain which has since subsided per EMS report  pt states having RUQ pain     Patient is an 37-year-old male, [place]-speaking, with history of hypertension, dyslipidemia that presents emergency department with complaints of episode of chest pain and abdominal pain  History is obtained from the grandson who acts as   He states that the patient was sitting down and when he stood up he had left-sided chest pain  He states lasted for approximately 10 minutes and then resolved completely  He denies any nausea, vomiting, diaphoresis, shortness of breath  He also states he has abdominal pain  He denies any diarrhea  Prior to Admission Medications   Prescriptions Last Dose Informant Patient Reported? Taking? Cholecalciferol (VITAMIN D3) 2000 units TABS  Child Yes No   Sig: Take 2,000 Units by mouth daily   amLODIPine (NORVASC) 10 mg tablet  Child Yes No   Sig: Take 10 mg by mouth daily   aspirin (ECOTRIN LOW STRENGTH) 81 mg EC tablet  Child Yes No   Sig: Take 81 mg by mouth daily   donepezil (ARICEPT) 5 mg tablet  Child Yes No   Sig: Take 5 mg by mouth daily at bedtime   hydrochlorothiazide (HYDRODIURIL) 25 mg tablet  Child Yes No   Sig: Take 25 mg by mouth daily   lidocaine (LIDODERM) 5 %   No No   Sig: Apply 1 patch topically daily Remove & Discard patch within 12 hours or as directed by MD   lisinopril (ZESTRIL) 20 mg tablet  Child Yes No   Sig: Take 20 mg by mouth daily   risperiDONE (RisperDAL) 0 5 mg tablet  Child Yes No   Sig: Take 0 5 mg by mouth 2 (two) times a day      Facility-Administered Medications: None       Past Medical History:   Diagnosis Date    [facility]     Hypertension     Prostate CA ([facility] )        History reviewed  No pertinent surgical history  History reviewed  No pertinent family history    I have reviewed and agree with the history as documented  Social History     Tobacco Use    Smoking status: Never Smoker    Smokeless tobacco: Never Used   Substance Use Topics    Alcohol use: Never     Frequency: Never    Drug use: Not on file        Review of Systems   Constitutional: Negative for fever  Respiratory: Negative for shortness of breath  Cardiovascular: Positive for chest pain  Gastrointestinal: Positive for abdominal pain  All other systems reviewed and are negative  Physical Exam  Physical Exam   Constitutional: He appears well-developed and well-nourished  HENT:   Head: Normocephalic and atraumatic  Eyes: Pupils are equal, round, and reactive to light  EOM are normal    Cardiovascular: Normal rate, regular rhythm and normal heart sounds  Pulmonary/Chest: Effort normal and breath sounds normal    Abdominal: Normal appearance and bowel sounds are normal  There is generalized tenderness  Neurological: He is alert  Psychiatric: He has a normal mood and affect  His behavior is normal    Vitals reviewed        Vital Signs  ED Triage Vitals [08/03/19 0029]   Temperature Pulse Respirations Blood Pressure SpO2   98 7 °F (37 1 °C) 80 18 105/67 98 %      Temp Source Heart Rate Source Patient Position - Orthostatic VS BP Location FiO2 (%)   Oral Monitor Lying Right arm --      Pain Score       --           Vitals:    08/03/19 0100 08/03/19 0145 08/03/19 0200 08/03/19 0300   BP: 105/66 137/77 101/68 117/69   Pulse: 77 81 78 87   Patient Position - Orthostatic VS: Lying Lying  Lying         Visual Acuity      ED Medications  Medications   sodium chloride 0 9 % bolus 1,000 mL (0 mL Intravenous Stopped 8/3/19 0326)   iohexol (OMNIPAQUE) 350 MG/ML injection (MULTI-DOSE) 100 mL (100 mL Intravenous Given 8/3/19 0248)       Diagnostic Studies  Results Reviewed     Procedure Component Value Units Date/Time    CMP [[id number]]  (Abnormal) Collected:  08/03/19 0100    Lab Status:  Final result Specimen:  Blood from Arm, Left Updated: 08/03/19 0129     Sodium 141 mmol/L      Potassium 3 1 mmol/L      Chloride 99 mmol/L      CO2 32 mmol/L      ANION GAP 10 mmol/L      BUN 27 mg/dL      Creatinine 1 53 mg/dL      Glucose 151 mg/dL      Calcium 10 0 mg/dL      AST 17 U/L      ALT 14 U/L      Alkaline Phosphatase 92 U/L      Total Protein 9 1 g/dL      Albumin 3 8 g/dL      Total Bilirubin 0 40 mg/dL      eGFR 42 ml/min/1 73sq m     Narrative:       National Kidney Disease Foundation guidelines for Chronic Kidney Disease (CKD):     Stage 1 with normal or high GFR (GFR > 90 mL/min/1 73 square meters)    Stage 2 Mild CKD (GFR = 60-89 mL/min/1 73 square meters)    Stage 3A Moderate CKD (GFR = 45-59 mL/min/1 73 square meters)    Stage 3B Moderate CKD (GFR = 30-44 mL/min/1 73 square meters)    Stage 4 Severe CKD (GFR = 15-29 mL/min/1 73 square meters)    Stage 5 End Stage CKD (GFR <15 mL/min/1 73 square meters)  Note: GFR calculation is accurate only with a steady state creatinine    Troponin I [[id number]]  (Normal) Collected:  08/03/19 0100    Lab Status:  Final result Specimen:  Blood from Arm, Left Updated:  08/03/19 0126     Troponin I <0 02 ng/mL     Lipase [[id number]]  (Normal) Collected:  08/03/19 0100    Lab Status:  Final result Specimen:  Blood from Arm, Left Updated:  08/03/19 0121     Lipase 137 u/L     CBC and differential [[id number]]  (Abnormal) Collected:  08/03/19 0100    Lab Status:  Final result Specimen:  Blood from Arm, Left Updated:  08/03/19 0107     WBC 9 51 Thousand/uL      RBC 4 33 Million/uL      Hemoglobin 11 6 g/dL      Hematocrit 35 4 %      MCV 82 fL      MCH 26 8 pg      MCHC 32 8 g/dL      RDW 14 2 %      MPV 10 1 fL      Platelets 779 Thousands/uL      nRBC 0 /100 WBCs      Neutrophils Relative 66 %      Immat GRANS % 0 %      Lymphocytes Relative 23 %      Monocytes Relative 9 %      Eosinophils Relative 2 %      Basophils Relative 0 %      Neutrophils Absolute 6 27 Thousands/µL      Immature Grans Absolute 0 03 Thousand/uL      Lymphocytes Absolute 2 16 Thousands/µL      Monocytes Absolute 0 81 Thousand/µL      Eosinophils Absolute 0 22 Thousand/µL      Basophils Absolute 0 02 Thousands/µL                  CTA dissection protocol chest and abdomen   Final Result by Denisse Yung DO (08/03 0256)      No evidence of aortic dissection or aneurysm  Workstation performed: [id number]         X-ray chest 2 views    (Results Pending)              Procedures  ECG 12 Lead Documentation Only  Date/Time: 8/3/2019 6:27 AM  Performed by: Elsie Clarke PA-C  Authorized by: Elsie Clarke PA-C     Indications / Diagnosis:  Chest pain  ECG reviewed by me, the ED Provider: yes    Patient location:  ED  Previous ECG:     Previous ECG:  Compared to current    Similarity:  No change  Interpretation:     Interpretation: normal    Rate:     ECG rate:  77    ECG rate assessment: normal    Rhythm:     Rhythm: sinus rhythm    Ectopy:     Ectopy: none    QRS:     QRS axis:  Normal    QRS intervals:  Normal  Conduction:     Conduction: normal    ST segments:     ST segments:  Normal  T waves:     T waves: normal             ED Course         HEART Risk Score      Most Recent Value   History  0 Filed at: 08/03/2019 0237   ECG  0 Filed at: 08/03/2019 1974   Age  2 Filed at: 08/03/2019 8168   Risk Factors  1 Filed at: 08/03/2019 0237   Troponin  0 Filed at: 08/03/2019 0237   Heart Score Risk Calculator   History  0 Filed at: 08/03/2019 0237   ECG  0 Filed at: 08/03/2019 9907   Age  2 Filed at: 08/03/2019 1604   Risk Factors  1 Filed at: 08/03/2019 0237   Troponin  0 Filed at: 08/03/2019 0237   HEART Score  3 Filed at: 08/03/2019 5160   HEART Score  3 Filed at: 08/03/2019 1704        Identification of Seniors at Risk      Most Recent Value   (ISAR) Identification of Seniors at Risk   Before the illness or injury that brought you to the Emergency, did you need someone to help you on a regular basis?   1 Filed at: 08/03/2019 0031   In the last 24 hours, have you needed more help than usual?  1 Filed at: 08/03/2019 0031   Have you been hospitalized for one or more nights during the past 6 months? 1 Filed at: 08/03/2019 0031   In general, do you see well?  0 Filed at: 08/03/2019 0031   In general, do you have serious problems with your memory? 0 Filed at: 08/03/2019 0031   Do you take more than three different medications every day? 1 Filed at: 08/03/2019 0031   ISAR Score  4 Filed at: 08/03/2019 0031                          MDM  Number of Diagnoses or Management Options  Chest pain:   Diagnosis management comments: Patient is an 72-year-old male presents emergency department complaints of chest pain and abdominal pain  Lab evaluation was performed does not show any abnormality  ACS workup was performed does not show any evidence of ACS  CTA chest abdomen pelvis for dissection study was performed does not show any abnormality  Findings were reviewed patient  He wishes to be discharged home  Return parameters were discussed  Patient stable for discharge  Amount and/or Complexity of Data Reviewed  Clinical lab tests: ordered and reviewed  Tests in the radiology section of CPT®: ordered and reviewed  Obtain history from someone other than the patient: yes    Risk of Complications, Morbidity, and/or Mortality  Presenting problems: moderate  Diagnostic procedures: moderate  Management options: moderate    Patient Progress  Patient progress: stable      Disposition  Final diagnoses:   Chest pain     Time reflects when diagnosis was documented in both MDM as applicable and the Disposition within this note     Time User Action Codes Description Comment    8/3/2019  3:10 AM Josiah Roblero Add [R07 9] Chest pain       ED Disposition     ED Disposition Condition Date/Time Comment    Discharge Good Sat Aug 3, 2019 [facility] discharge to home/self care              Follow-up Information     Follow up With Specialties Details Why Contact Ugo Pedroza PA-C Physician Assistant Schedule an appointment as soon as possible for a visit   [address]  [phone]            Discharge Medication List as of 8/3/2019  3:10 AM      CONTINUE these medications which have NOT CHANGED    Details   amLODIPine (NORVASC) 10 mg tablet Take 10 mg by mouth daily, Historical Med      aspirin (ECOTRIN LOW STRENGTH) 81 mg EC tablet Take 81 mg by mouth daily, Historical Med      Cholecalciferol (VITAMIN D3) 2000 units TABS Take 2,000 Units by mouth daily, Historical Med      donepezil (ARICEPT) 5 mg tablet Take 5 mg by mouth daily at bedtime, Historical Med      hydrochlorothiazide (HYDRODIURIL) 25 mg tablet Take 25 mg by mouth daily, Historical Med      lidocaine (LIDODERM) 5 % Apply 1 patch topically daily Remove & Discard patch within 12 hours or as directed by MD, Starting Thu 2/21/2019, Print      lisinopril (ZESTRIL) 20 mg tablet Take 20 mg by mouth daily, Historical Med      risperiDONE (RisperDAL) 0 5 mg tablet Take 0 5 mg by mouth 2 (two) times a day, Historical Med           No discharge procedures on file      ED Provider  Electronically Signed by           Segundo Osorio PA-C  08/03/19 5570

## 2020-09-14 ENCOUNTER — TELEPHONE (OUTPATIENT)
Dept: PULMONOLOGY | Facility: CLINIC | Age: 85
End: 2020-09-14

## 2020-09-15 ENCOUNTER — APPOINTMENT (OUTPATIENT)
Dept: LAB | Facility: CLINIC | Age: 85
End: 2020-09-15
Payer: COMMERCIAL

## 2020-09-15 ENCOUNTER — CONSULT (OUTPATIENT)
Dept: PULMONOLOGY | Facility: CLINIC | Age: 85
End: 2020-09-15
Payer: COMMERCIAL

## 2020-09-15 VITALS
SYSTOLIC BLOOD PRESSURE: 124 MMHG | DIASTOLIC BLOOD PRESSURE: 84 MMHG | BODY MASS INDEX: 26.68 KG/M2 | TEMPERATURE: 97.7 F | WEIGHT: 145 LBS | HEART RATE: 74 BPM | HEIGHT: 62 IN | OXYGEN SATURATION: 95 %

## 2020-09-15 DIAGNOSIS — R05.3 CHRONIC COUGH: ICD-10-CM

## 2020-09-15 DIAGNOSIS — R05.3 CHRONIC COUGH: Primary | ICD-10-CM

## 2020-09-15 DIAGNOSIS — J45.991 COUGH VARIANT ASTHMA: ICD-10-CM

## 2020-09-15 PROCEDURE — 82785 ASSAY OF IGE: CPT

## 2020-09-15 PROCEDURE — 36415 COLL VENOUS BLD VENIPUNCTURE: CPT

## 2020-09-15 PROCEDURE — 99204 OFFICE O/P NEW MOD 45 MIN: CPT | Performed by: INTERNAL MEDICINE

## 2020-09-15 PROCEDURE — 86003 ALLG SPEC IGE CRUDE XTRC EA: CPT

## 2020-09-15 RX ORDER — MONTELUKAST SODIUM 10 MG/1
10 TABLET ORAL
Qty: 30 TABLET | Refills: 3 | Status: SHIPPED | OUTPATIENT
Start: 2020-09-15 | End: 2021-10-06

## 2020-09-15 NOTE — PROGRESS NOTES
Assessment/Plan:     {Assess/PlanSLucianainks:02433}      No follow-ups on file  All questions are answered to the patient's satisfaction and understanding  He verbalizes understanding  He is encouraged to call with any further questions or concerns  Portions of the record may have been created with voice recognition software  Occasional wrong word or "sound a like" substitutions may have occurred due to the inherent limitations of voice recognition software  Read the chart carefully and recognize, using context, where substitutions have occurred  a    Electronically Signed by Jarvis Perez MD    ______________________________________________________________________    Chief Complaint:   Chief Complaint   Patient presents with    Shortness of Breath    Cough        Patient ID: Deedee Lane is a 82 y o  y o  male has a past medical history of [facility] ([facility] ), Hypertension, and [facility]  9/15/2020  Patient presents today for initial visit  HPI    Occupational/Exposure history:  Pets/Enviroment:  Travel history:  Review of Systems   Constitutional: Positive for fatigue  Social history: He reports that he has never smoked  He has never used smokeless tobacco  He reports that he does not drink alcohol  Past surgical history: No past surgical history on file  Family history: No family history on file  There is no immunization history on file for this patient    Current Outpatient Medications   Medication Sig Dispense Refill    amLODIPine (NORVASC) 10 mg tablet Take 10 mg by mouth daily      aspirin (ECOTRIN LOW STRENGTH) 81 mg EC tablet Take 81 mg by mouth daily      Cholecalciferol (VITAMIN D3) 2000 units TABS Take 2,000 Units by mouth daily      donepezil (ARICEPT) 5 mg tablet Take 5 mg by mouth daily at bedtime      hydrochlorothiazide (HYDRODIURIL) 25 mg tablet Take 25 mg by mouth daily      lidocaine (LIDODERM) 5 % Apply 1 patch topically daily Remove & Discard patch within 12 hours or as directed by MD 30 patch 0    lisinopril (ZESTRIL) 20 mg tablet Take 20 mg by mouth daily      risperiDONE (RisperDAL) 0 5 mg tablet Take 0 5 mg by mouth 2 (two) times a day       No current facility-administered medications for this visit  Allergies: Patient has no known allergies  Objective:  Vitals:    09/15/20 1235   BP: 124/84   Pulse: 74   Temp: 97 7 °F (36 5 °C)   SpO2: 95%   Weight: 65 8 kg (145 lb)   Height: 5' 2" (1 575 m)   Oxygen Therapy  SpO2: 95 %    Wt Readings from Last 3 Encounters:   09/15/20 65 8 kg (145 lb)   08/03/19 69 3 kg (152 lb 12 5 oz)   02/20/19 77 6 kg (171 lb)     Body mass index is 26 52 kg/m²  Physical Exam    Lab Review:   {Recent QMCR:02319::"RTK applicable"}    Diagnostics:  {Results Review Statement:90605}  {DIAGNOSTICS:26284}  Office Spirometry Results:     ESS:    No results found

## 2020-09-15 NOTE — PROGRESS NOTES
Assessment/Plan:     Diagnoses and all orders for this visit:    Chronic cough  -     montelukast (SINGULAIR) 10 mg tablet; Take 1 tablet (10 mg total) by mouth daily at bedtime  -     Complete PFT without post bronchodilator; Future  -     [facility], Adult; Future    Cough variant asthma      chronic cough likely secondary to cough variant asthma/allergies  , also discussed with the patient and the patient's daughter that the lisinopril that he is on can cause a cough but we will follow-up with the about testing 1st before we take and switch his the lisinopril  Will get complete PFT with post bronchodilator and follow-up  Respiratory allergy panel with IgE  Reviewed CT of the chest results with complete normal parenchyma no evidence of any lung nodules  Add Singulair 10 mg daily at bedtime  Will follow-up with the above testing  Call if any worsening symptoms of or shortness of breath or wheezing  Return in about 6 weeks (around 10/27/2020)  All questions are answered to the patient's satisfaction and understanding  He verbalizes understanding  He is encouraged to call with any further questions or concerns  Portions of the record may have been created with voice recognition software  Occasional wrong word or "sound a like" substitutions may have occurred due to the inherent limitations of voice recognition software  Read the chart carefully and recognize, using context, where substitutions have occurred  a    Electronically Signed by Darryle Breaker, MD    ______________________________________________________________________    Chief Complaint:   Chief Complaint   Patient presents with    Shortness of Breath    Cough        Patient ID: Tierney Garcia is a 82 y o  y o  male has a past medical history of Diabetes mellitus ([facility] ), Hypertension, and Prostate CA ([facility] )  9/15/2020  Patient presents today for initial visit  Patient is Citizen of [place]-speaking, his daughter does understand [place]    Patient is a very pleasant 43-year-old gentleman who has never smoked, with history of chronic cough mainly dry nonproductive for the past 3-4 months which he states is getting worse  Occasionally does bring up white mucoid sputum no evidence of any hemoptysis  Does have a dog at home  Does not have any significant shortness of breath, no wheezing    Pets/Enviroment:  Dog  Travel history:  None  Review of Systems   Constitutional: Positive for fatigue  HENT: Negative  Eyes: Negative  Respiratory: Positive for cough and shortness of breath  Cardiovascular: Negative  Gastrointestinal: Negative  Endocrine: Negative  Genitourinary: Negative  Musculoskeletal: Negative  Allergic/Immunologic: Positive for environmental allergies  Neurological: Negative  Hematological: Negative  Psychiatric/Behavioral: Negative  Social history: He reports that he has never smoked  He has never used smokeless tobacco  He reports that he does not drink alcohol  Past surgical history: No past surgical history on file  Family history: No family history on file  There is no immunization history on file for this patient    Current Outpatient Medications   Medication Sig Dispense Refill    amLODIPine (NORVASC) 10 mg tablet Take 10 mg by mouth daily      aspirin (ECOTRIN LOW STRENGTH) 81 mg EC tablet Take 81 mg by mouth daily      Cholecalciferol (VITAMIN D3) 2000 units TABS Take 2,000 Units by mouth daily      donepezil (ARICEPT) 5 mg tablet Take 5 mg by mouth daily at bedtime      hydrochlorothiazide (HYDRODIURIL) 25 mg tablet Take 25 mg by mouth daily      lidocaine (LIDODERM) 5 % Apply 1 patch topically daily Remove & Discard patch within 12 hours or as directed by MD 30 patch 0    lisinopril (ZESTRIL) 20 mg tablet Take 20 mg by mouth daily      risperiDONE (RisperDAL) 0 5 mg tablet Take 0 5 mg by mouth 2 (two) times a day      montelukast (SINGULAIR) 10 mg tablet Take 1 tablet (10 mg total) by mouth daily at bedtime 30 tablet 3     No current facility-administered medications for this visit  Allergies: Patient has no known allergies  Objective:  Vitals:    09/15/20 1235   BP: 124/84   Pulse: 74   Temp: 97 7 °F (36 5 °C)   SpO2: 95%   Weight: 65 8 kg (145 lb)   Height: 5' 2" (1 575 m)   Oxygen Therapy  SpO2: 95 %    Wt Readings from Last 3 Encounters:   09/15/20 65 8 kg (145 lb)   08/03/19 69 3 kg (152 lb 12 5 oz)   02/20/19 77 6 kg (171 lb)     Body mass index is 26 52 kg/m²  Physical Exam  Vitals signs and nursing note reviewed  Constitutional:       Appearance: He is well-developed  HENT:      Head: Normocephalic and atraumatic  Eyes:      Conjunctiva/sclera: Conjunctivae normal       Pupils: Pupils are equal, round, and reactive to light  Neck:      Musculoskeletal: Normal range of motion and neck supple  Thyroid: No thyromegaly  Vascular: No JVD  Cardiovascular:      Rate and Rhythm: Normal rate and regular rhythm  Heart sounds: Normal heart sounds  No murmur  No friction rub  No gallop  Pulmonary:      Effort: Pulmonary effort is normal  No respiratory distress  Breath sounds: Normal breath sounds  No wheezing or rales  Chest:      Chest wall: No tenderness  Musculoskeletal: Normal range of motion  General: No tenderness or deformity  Lymphadenopathy:      Cervical: No cervical adenopathy  Skin:     General: Skin is warm and dry  Neurological:      Mental Status: He is alert and oriented to person, place, and time             Diagnostics:  I have personally reviewed pertinent films in PACS

## 2020-09-15 NOTE — PROGRESS NOTES
Assessment/Plan:     {Assess/PlanSSummers:64541}      Return in about 6 weeks (around 10/27/2020)  All questions are answered to the patient's satisfaction and understanding  He verbalizes understanding  He is encouraged to call with any further questions or concerns  Portions of the record may have been created with voice recognition software  Occasional wrong word or "sound a like" substitutions may have occurred due to the inherent limitations of voice recognition software  Read the chart carefully and recognize, using context, where substitutions have occurred  a    Electronically Signed by Aryan Gurrola MD    ______________________________________________________________________    Chief Complaint:   Chief Complaint   Patient presents with    Shortness of Breath    Cough        Patient ID: Karine Schwartz is a 82 y o  y o  male has a past medical history of [facility] ([facility] ), Hypertension, and [facility]  9/15/2020  Patient presents today for initial visit  HPI    Occupational/Exposure history:  Pets/Enviroment:  Travel history:  Review of Systems   Constitutional: Positive for fatigue  Respiratory: Positive for cough and shortness of breath  Social history: He reports that he has never smoked  He has never used smokeless tobacco  He reports that he does not drink alcohol  Past surgical history: History reviewed  No pertinent surgical history  Family history: History reviewed  No pertinent family history  There is no immunization history on file for this patient    Current Outpatient Medications   Medication Sig Dispense Refill    amLODIPine (NORVASC) 10 mg tablet Take 10 mg by mouth daily      aspirin (ECOTRIN LOW STRENGTH) 81 mg EC tablet Take 81 mg by mouth daily      Cholecalciferol (VITAMIN D3) 2000 units TABS Take 2,000 Units by mouth daily      donepezil (ARICEPT) 5 mg tablet Take 5 mg by mouth daily at bedtime      hydrochlorothiazide (HYDRODIURIL) 25 mg tablet Take 25 mg by mouth daily      lidocaine (LIDODERM) 5 % Apply 1 patch topically daily Remove & Discard patch within 12 hours or as directed by MD 30 patch 0    lisinopril (ZESTRIL) 20 mg tablet Take 20 mg by mouth daily      risperiDONE (RisperDAL) 0 5 mg tablet Take 0 5 mg by mouth 2 (two) times a day      montelukast (SINGULAIR) 10 mg tablet Take 1 tablet (10 mg total) by mouth daily at bedtime 30 tablet 3     No current facility-administered medications for this visit  Allergies: Patient has no known allergies  Objective:  Vitals:    09/15/20 1235   BP: 124/84   Pulse: 74   Temp: 97 7 °F (36 5 °C)   SpO2: 95%   Weight: 65 8 kg (145 lb)   Height: 5' 2" (1 575 m)   Oxygen Therapy  SpO2: 95 %    Wt Readings from Last 3 Encounters:   09/15/20 65 8 kg (145 lb)   08/03/19 69 3 kg (152 lb 12 5 oz)   02/20/19 77 6 kg (171 lb)     Body mass index is 26 52 kg/m²  Physical Exam    Lab Review:   {Recent NOHO:06355::"XRV applicable"}    Diagnostics:  {Results Review Statement:71990}  {DIAGNOSTICS:21301}  Office Spirometry Results:     ESS:    No results found

## 2020-09-15 NOTE — PROGRESS NOTES
Assessment/Plan:     {Assess/PlanSSummers:58508}      Return in about 6 weeks (around 10/27/2020)  All questions are answered to the patient's satisfaction and understanding  He verbalizes understanding  He is encouraged to call with any further questions or concerns  Portions of the record may have been created with voice recognition software  Occasional wrong word or "sound a like" substitutions may have occurred due to the inherent limitations of voice recognition software  Read the chart carefully and recognize, using context, where substitutions have occurred  a    Electronically Signed by Luis Lewis MD    ______________________________________________________________________    Chief Complaint:   Chief Complaint   Patient presents with    Shortness of Breath    Cough        Patient ID: Nito Byers is a 82 y o  y o  male has a past medical history of Diabetes mellitus ([facility] [address] [facility] ), Hypertension, and [facility]  9/15/2020  Patient presents today for initial visit  Shortness of Breath     Cough   Associated symptoms include shortness of breath  Occupational/Exposure history:  Pets/Enviroment:  Travel history:  Review of Systems   Constitutional: Positive for fatigue  Respiratory: Positive for cough and shortness of breath  Social history: He reports that he has never smoked  He has never used smokeless tobacco  He reports that he does not drink alcohol  Past surgical history: History reviewed  No pertinent surgical history  Family history: History reviewed  No pertinent family history  There is no immunization history on file for this patient    Current Outpatient Medications   Medication Sig Dispense Refill    amLODIPine (NORVASC) 10 mg tablet Take 10 mg by mouth daily      aspirin (ECOTRIN LOW STRENGTH) 81 mg EC tablet Take 81 mg by mouth daily      Cholecalciferol (VITAMIN D3) 2000 units TABS Take 2,000 Units by mouth daily      donepezil (ARICEPT) 5 mg tablet Take 5 mg by mouth daily at bedtime      hydrochlorothiazide (HYDRODIURIL) 25 mg tablet Take 25 mg by mouth daily      lidocaine (LIDODERM) 5 % Apply 1 patch topically daily Remove & Discard patch within 12 hours or as directed by MD 30 patch 0    lisinopril (ZESTRIL) 20 mg tablet Take 20 mg by mouth daily      risperiDONE (RisperDAL) 0 5 mg tablet Take 0 5 mg by mouth 2 (two) times a day      montelukast (SINGULAIR) 10 mg tablet Take 1 tablet (10 mg total) by mouth daily at bedtime 30 tablet 3     No current facility-administered medications for this visit  Allergies: Patient has no known allergies  Objective:  Vitals:    09/15/20 1235   BP: 124/84   Pulse: 74   Temp: 97 7 °F (36 5 °C)   SpO2: 95%   Weight: 65 8 kg (145 lb)   Height: 5' 2" (1 575 m)   Oxygen Therapy  SpO2: 95 %    Wt Readings from Last 3 Encounters:   09/15/20 65 8 kg (145 lb)   08/03/19 69 3 kg (152 lb 12 5 oz)   02/20/19 77 6 kg (171 lb)     Body mass index is 26 52 kg/m²  Physical Exam    Lab Review:   {Recent ARIT:17390::"QJO applicable"}    Diagnostics:  {Results Review Statement:47743}  {DIAGNOSTICS:00359}  Office Spirometry Results:     ESS:    No results found

## 2020-09-16 LAB
A ALTERNATA IGE QN: <0.1 KUA/I
A FUMIGATUS IGE QN: 0.29 KUA/I
ALLERGEN COMMENT: ABNORMAL
BERMUDA GRASS IGE QN: <0.1 KUA/I
BOXELDER IGE QN: <0.1 KUA/I
C HERBARUM IGE QN: <0.1 KUA/I
CAT DANDER IGE QN: <0.1 KUA/I
CMN PIGWEED IGE QN: <0.1 KUA/I
COMMON RAGWEED IGE QN: <0.1 KUA/I
COTTONWOOD IGE QN: <0.1 KUA/I
D FARINAE IGE QN: 0.7 KUA/I
D PTERONYSS IGE QN: 0.8 KUA/I
DOG DANDER IGE QN: <0.1 KUA/I
LONDON PLANE IGE QN: <0.1 KUA/I
MOUSE URINE PROT IGE QN: <0.1 KUA/I
MT JUNIPER IGE QN: <0.1 KUA/I
MUGWORT IGE QN: <0.1 KUA/I
P NOTATUM IGE QN: 0.53 KUA/I
ROACH IGE QN: 0.63 KUA/I
SHEEP SORREL IGE QN: <0.1 KUA/I
SILVER BIRCH IGE QN: <0.1 KUA/I
TIMOTHY IGE QN: <0.1 KUA/I
TOTAL IGE SMQN RAST: 406 KU/L (ref 0–113)
WALNUT IGE QN: <0.1 KUA/I
WHITE ASH IGE QN: <0.1 KUA/I
WHITE ELM IGE QN: <0.1 KUA/I
WHITE MULBERRY IGE QN: <0.1 KUA/I
WHITE OAK IGE QN: <0.1 KUA/I

## 2020-10-22 ENCOUNTER — DOCUMENTATION (OUTPATIENT)
Dept: UROLOGY | Facility: CLINIC | Age: 85
End: 2020-10-22

## 2020-10-26 ENCOUNTER — TELEPHONE (OUTPATIENT)
Dept: UROLOGY | Facility: MEDICAL CENTER | Age: 85
End: 2020-10-26

## 2020-12-14 ENCOUNTER — TELEPHONE (OUTPATIENT)
Dept: UROLOGY | Facility: CLINIC | Age: 85
End: 2020-12-14

## 2021-03-23 PROBLEM — C61 PROSTATE CANCER (HCC): Status: ACTIVE | Noted: 2021-03-23

## 2021-06-19 ENCOUNTER — HOSPITAL ENCOUNTER (OUTPATIENT)
Facility: HOSPITAL | Age: 86
Setting detail: OBSERVATION
Discharge: HOME | End: 2021-06-21
Attending: EMERGENCY MEDICINE | Admitting: FAMILY MEDICINE
Payer: COMMERCIAL

## 2021-06-19 ENCOUNTER — APPOINTMENT (EMERGENCY)
Dept: CT IMAGING | Facility: HOSPITAL | Age: 86
End: 2021-06-19
Payer: COMMERCIAL

## 2021-06-19 DIAGNOSIS — R61 DIAPHORESIS: ICD-10-CM

## 2021-06-19 DIAGNOSIS — Z91.89 AT RISK FOR INJURY RELATED TO RESTRAINTS: ICD-10-CM

## 2021-06-19 DIAGNOSIS — I21.4 NSTEMI (NON-ST ELEVATED MYOCARDIAL INFARCTION) (HCC): Primary | ICD-10-CM

## 2021-06-19 PROBLEM — Z86.16 HISTORY OF COVID-19: Status: ACTIVE | Noted: 2021-06-19

## 2021-06-19 PROBLEM — R65.10 SIRS (SYSTEMIC INFLAMMATORY RESPONSE SYNDROME) (HCC): Status: ACTIVE | Noted: 2021-06-19

## 2021-06-19 PROBLEM — I10 HTN (HYPERTENSION): Status: ACTIVE | Noted: 2021-06-19

## 2021-06-19 PROBLEM — G30.9 ALZHEIMER'S DEMENTIA (HCC): Status: ACTIVE | Noted: 2021-06-19

## 2021-06-19 PROBLEM — R79.89 ELEVATED TROPONIN: Status: ACTIVE | Noted: 2021-06-19

## 2021-06-19 PROBLEM — F02.80 ALZHEIMER'S DEMENTIA (HCC): Status: ACTIVE | Noted: 2021-06-19

## 2021-06-19 PROBLEM — R77.8 ELEVATED TROPONIN: Status: ACTIVE | Noted: 2021-06-19

## 2021-06-19 LAB
ALBUMIN SERPL BCP-MCNC: 3.5 G/DL (ref 3.5–5)
ALP SERPL-CCNC: 81 U/L (ref 46–116)
ALT SERPL W P-5'-P-CCNC: 26 U/L (ref 12–78)
ANION GAP SERPL CALCULATED.3IONS-SCNC: 8 MMOL/L (ref 4–13)
APTT PPP: 34 SECONDS (ref 23–37)
APTT PPP: 87 SECONDS (ref 23–37)
AST SERPL W P-5'-P-CCNC: 19 U/L (ref 5–45)
ATRIAL RATE: 82 BPM
ATRIAL RATE: 85 BPM
ATRIAL RATE: 89 BPM
BACTERIA UR QL AUTO: NORMAL /HPF
BASOPHILS # BLD AUTO: 0.04 THOUSANDS/ΜL (ref 0–0.1)
BASOPHILS NFR BLD AUTO: 0 % (ref 0–1)
BILIRUB SERPL-MCNC: 0.26 MG/DL (ref 0.2–1)
BILIRUB UR QL STRIP: NEGATIVE
BUN SERPL-MCNC: 13 MG/DL (ref 5–25)
CALCIUM SERPL-MCNC: 9.2 MG/DL (ref 8.3–10.1)
CHLORIDE SERPL-SCNC: 104 MMOL/L (ref 100–108)
CLARITY UR: CLEAR
CO2 SERPL-SCNC: 29 MMOL/L (ref 21–32)
COLOR UR: YELLOW
CREAT SERPL-MCNC: 1.41 MG/DL (ref 0.6–1.3)
EOSINOPHIL # BLD AUTO: 0.07 THOUSAND/ΜL (ref 0–0.61)
EOSINOPHIL NFR BLD AUTO: 1 % (ref 0–6)
ERYTHROCYTE [DISTWIDTH] IN BLOOD BY AUTOMATED COUNT: 15.3 % (ref 11.6–15.1)
GFR SERPL CREATININE-BSD FRML MDRD: 45 ML/MIN/1.73SQ M
GLUCOSE SERPL-MCNC: 117 MG/DL (ref 65–140)
GLUCOSE UR STRIP-MCNC: NEGATIVE MG/DL
HCT VFR BLD AUTO: 39.4 % (ref 36.5–49.3)
HGB BLD-MCNC: 12.4 G/DL (ref 12–17)
HGB UR QL STRIP.AUTO: ABNORMAL
IMM GRANULOCYTES # BLD AUTO: 0.04 THOUSAND/UL (ref 0–0.2)
IMM GRANULOCYTES NFR BLD AUTO: 0 % (ref 0–2)
INR PPP: 1.09 (ref 0.84–1.19)
KETONES UR STRIP-MCNC: NEGATIVE MG/DL
LACTATE SERPL-SCNC: 1.4 MMOL/L (ref 0.5–2)
LACTATE SERPL-SCNC: 2.1 MMOL/L (ref 0.5–2)
LACTATE SERPL-SCNC: 3.1 MMOL/L (ref 0.5–2)
LEUKOCYTE ESTERASE UR QL STRIP: NEGATIVE
LYMPHOCYTES # BLD AUTO: 1.45 THOUSANDS/ΜL (ref 0.6–4.47)
LYMPHOCYTES NFR BLD AUTO: 11 % (ref 14–44)
MCH RBC QN AUTO: 25.3 PG (ref 26.8–34.3)
MCHC RBC AUTO-ENTMCNC: 31.5 G/DL (ref 31.4–37.4)
MCV RBC AUTO: 80 FL (ref 82–98)
MONOCYTES # BLD AUTO: 0.83 THOUSAND/ΜL (ref 0.17–1.22)
MONOCYTES NFR BLD AUTO: 7 % (ref 4–12)
NEUTROPHILS # BLD AUTO: 10.25 THOUSANDS/ΜL (ref 1.85–7.62)
NEUTS SEG NFR BLD AUTO: 81 % (ref 43–75)
NITRITE UR QL STRIP: NEGATIVE
NON-SQ EPI CELLS URNS QL MICRO: NORMAL /HPF
NRBC BLD AUTO-RTO: 0 /100 WBCS
P AXIS: 28 DEGREES
P AXIS: 32 DEGREES
P AXIS: 49 DEGREES
PH UR STRIP.AUTO: 5.5 [PH]
PLATELET # BLD AUTO: 285 THOUSANDS/UL (ref 149–390)
PMV BLD AUTO: 9.8 FL (ref 8.9–12.7)
POTASSIUM SERPL-SCNC: 4 MMOL/L (ref 3.5–5.3)
PR INTERVAL: 184 MS
PR INTERVAL: 188 MS
PR INTERVAL: 194 MS
PROT SERPL-MCNC: 7.4 G/DL (ref 6.4–8.2)
PROT UR STRIP-MCNC: ABNORMAL MG/DL
PROTHROMBIN TIME: 14.3 SECONDS (ref 11.6–14.5)
QRS AXIS: -25 DEGREES
QRS AXIS: -28 DEGREES
QRS AXIS: -36 DEGREES
QRSD INTERVAL: 84 MS
QRSD INTERVAL: 86 MS
QRSD INTERVAL: 88 MS
QT INTERVAL: 362 MS
QT INTERVAL: 370 MS
QT INTERVAL: 386 MS
QTC INTERVAL: 440 MS
QTC INTERVAL: 440 MS
QTC INTERVAL: 450 MS
RBC # BLD AUTO: 4.9 MILLION/UL (ref 3.88–5.62)
RBC #/AREA URNS AUTO: NORMAL /HPF
SODIUM SERPL-SCNC: 141 MMOL/L (ref 136–145)
SP GR UR STRIP.AUTO: 1.02 (ref 1–1.03)
T WAVE AXIS: -9 DEGREES
T WAVE AXIS: 1 DEGREES
T WAVE AXIS: 9 DEGREES
TROPONIN I SERPL-MCNC: 0.04 NG/ML
TROPONIN I SERPL-MCNC: 0.08 NG/ML
TROPONIN I SERPL-MCNC: 0.08 NG/ML
TROPONIN I SERPL-MCNC: <0.02 NG/ML
UROBILINOGEN UR QL STRIP.AUTO: 0.2 E.U./DL
VENTRICULAR RATE: 82 BPM
VENTRICULAR RATE: 85 BPM
VENTRICULAR RATE: 89 BPM
WBC # BLD AUTO: 12.68 THOUSAND/UL (ref 4.31–10.16)
WBC #/AREA URNS AUTO: NORMAL /HPF

## 2021-06-19 PROCEDURE — 83605 ASSAY OF LACTIC ACID: CPT | Performed by: FAMILY MEDICINE

## 2021-06-19 PROCEDURE — 71275 CT ANGIOGRAPHY CHEST: CPT

## 2021-06-19 PROCEDURE — 81001 URINALYSIS AUTO W/SCOPE: CPT | Performed by: EMERGENCY MEDICINE

## 2021-06-19 PROCEDURE — 84484 ASSAY OF TROPONIN QUANT: CPT | Performed by: EMERGENCY MEDICINE

## 2021-06-19 PROCEDURE — 80053 COMPREHEN METABOLIC PANEL: CPT | Performed by: EMERGENCY MEDICINE

## 2021-06-19 PROCEDURE — 85730 THROMBOPLASTIN TIME PARTIAL: CPT | Performed by: FAMILY MEDICINE

## 2021-06-19 PROCEDURE — 87040 BLOOD CULTURE FOR BACTERIA: CPT | Performed by: EMERGENCY MEDICINE

## 2021-06-19 PROCEDURE — 99285 EMERGENCY DEPT VISIT HI MDM: CPT | Performed by: EMERGENCY MEDICINE

## 2021-06-19 PROCEDURE — 84484 ASSAY OF TROPONIN QUANT: CPT | Performed by: STUDENT IN AN ORGANIZED HEALTH CARE EDUCATION/TRAINING PROGRAM

## 2021-06-19 PROCEDURE — 96360 HYDRATION IV INFUSION INIT: CPT

## 2021-06-19 PROCEDURE — 36415 COLL VENOUS BLD VENIPUNCTURE: CPT | Performed by: EMERGENCY MEDICINE

## 2021-06-19 PROCEDURE — 96372 THER/PROPH/DIAG INJ SC/IM: CPT

## 2021-06-19 PROCEDURE — 99220 PR INITIAL OBSERVATION CARE/DAY 70 MINUTES: CPT | Performed by: STUDENT IN AN ORGANIZED HEALTH CARE EDUCATION/TRAINING PROGRAM

## 2021-06-19 PROCEDURE — 99203 OFFICE O/P NEW LOW 30 MIN: CPT | Performed by: INTERNAL MEDICINE

## 2021-06-19 PROCEDURE — 85025 COMPLETE CBC W/AUTO DIFF WBC: CPT | Performed by: EMERGENCY MEDICINE

## 2021-06-19 PROCEDURE — 85610 PROTHROMBIN TIME: CPT | Performed by: EMERGENCY MEDICINE

## 2021-06-19 PROCEDURE — 72125 CT NECK SPINE W/O DYE: CPT

## 2021-06-19 PROCEDURE — 85730 THROMBOPLASTIN TIME PARTIAL: CPT | Performed by: EMERGENCY MEDICINE

## 2021-06-19 PROCEDURE — 93010 ELECTROCARDIOGRAM REPORT: CPT | Performed by: INTERNAL MEDICINE

## 2021-06-19 PROCEDURE — 74177 CT ABD & PELVIS W/CONTRAST: CPT

## 2021-06-19 PROCEDURE — 93005 ELECTROCARDIOGRAM TRACING: CPT

## 2021-06-19 PROCEDURE — 70450 CT HEAD/BRAIN W/O DYE: CPT

## 2021-06-19 PROCEDURE — 99285 EMERGENCY DEPT VISIT HI MDM: CPT

## 2021-06-19 RX ORDER — DONEPEZIL HYDROCHLORIDE 5 MG/1
5 TABLET, FILM COATED ORAL
Status: DISCONTINUED | OUTPATIENT
Start: 2021-06-19 | End: 2021-06-21 | Stop reason: HOSPADM

## 2021-06-19 RX ORDER — RISPERIDONE 0.25 MG/1
0.5 TABLET, FILM COATED ORAL 2 TIMES DAILY
Status: DISCONTINUED | OUTPATIENT
Start: 2021-06-19 | End: 2021-06-21 | Stop reason: HOSPADM

## 2021-06-19 RX ORDER — OLANZAPINE 10 MG/1
10 INJECTION, POWDER, LYOPHILIZED, FOR SOLUTION INTRAMUSCULAR ONCE
Status: COMPLETED | OUTPATIENT
Start: 2021-06-19 | End: 2021-06-19

## 2021-06-19 RX ORDER — HEPARIN SODIUM 1000 [USP'U]/ML
3900 INJECTION, SOLUTION INTRAVENOUS; SUBCUTANEOUS ONCE
Status: COMPLETED | OUTPATIENT
Start: 2021-06-19 | End: 2021-06-19

## 2021-06-19 RX ORDER — ASPIRIN 81 MG/1
81 TABLET ORAL DAILY
Status: DISCONTINUED | OUTPATIENT
Start: 2021-06-19 | End: 2021-06-21 | Stop reason: HOSPADM

## 2021-06-19 RX ORDER — HEPARIN SODIUM 1000 [USP'U]/ML
1950 INJECTION, SOLUTION INTRAVENOUS; SUBCUTANEOUS
Status: DISCONTINUED | OUTPATIENT
Start: 2021-06-19 | End: 2021-06-19

## 2021-06-19 RX ORDER — HALOPERIDOL 5 MG/ML
5 INJECTION INTRAMUSCULAR ONCE
Status: COMPLETED | OUTPATIENT
Start: 2021-06-19 | End: 2021-06-19

## 2021-06-19 RX ORDER — LISINOPRIL 20 MG/1
20 TABLET ORAL DAILY
Status: DISCONTINUED | OUTPATIENT
Start: 2021-06-19 | End: 2021-06-21 | Stop reason: HOSPADM

## 2021-06-19 RX ORDER — HYDROCHLOROTHIAZIDE 25 MG/1
25 TABLET ORAL DAILY
Status: DISCONTINUED | OUTPATIENT
Start: 2021-06-19 | End: 2021-06-21 | Stop reason: HOSPADM

## 2021-06-19 RX ORDER — MONTELUKAST SODIUM 10 MG/1
10 TABLET ORAL
Status: DISCONTINUED | OUTPATIENT
Start: 2021-06-19 | End: 2021-06-21 | Stop reason: HOSPADM

## 2021-06-19 RX ORDER — SODIUM CHLORIDE 9 MG/ML
100 INJECTION, SOLUTION INTRAVENOUS CONTINUOUS
Status: DISCONTINUED | OUTPATIENT
Start: 2021-06-19 | End: 2021-06-20

## 2021-06-19 RX ORDER — AMLODIPINE BESYLATE 10 MG/1
10 TABLET ORAL DAILY
Status: DISCONTINUED | OUTPATIENT
Start: 2021-06-19 | End: 2021-06-21 | Stop reason: HOSPADM

## 2021-06-19 RX ORDER — HEPARIN SODIUM 5000 [USP'U]/ML
5000 INJECTION, SOLUTION INTRAVENOUS; SUBCUTANEOUS EVERY 8 HOURS SCHEDULED
Status: DISCONTINUED | OUTPATIENT
Start: 2021-06-19 | End: 2021-06-21 | Stop reason: HOSPADM

## 2021-06-19 RX ORDER — HEPARIN SODIUM 1000 [USP'U]/ML
3900 INJECTION, SOLUTION INTRAVENOUS; SUBCUTANEOUS
Status: DISCONTINUED | OUTPATIENT
Start: 2021-06-19 | End: 2021-06-19

## 2021-06-19 RX ORDER — HYDROXYZINE HYDROCHLORIDE 25 MG/1
25 TABLET, FILM COATED ORAL ONCE
Status: COMPLETED | OUTPATIENT
Start: 2021-06-19 | End: 2021-06-19

## 2021-06-19 RX ORDER — HEPARIN SODIUM 10000 [USP'U]/100ML
3-20 INJECTION, SOLUTION INTRAVENOUS
Status: DISCONTINUED | OUTPATIENT
Start: 2021-06-19 | End: 2021-06-19

## 2021-06-19 RX ADMIN — ASPIRIN 81 MG: 81 TABLET, COATED ORAL at 16:07

## 2021-06-19 RX ADMIN — OLANZAPINE 10 MG: 10 INJECTION, POWDER, FOR SOLUTION INTRAMUSCULAR at 03:05

## 2021-06-19 RX ADMIN — LISINOPRIL 20 MG: 20 TABLET ORAL at 16:07

## 2021-06-19 RX ADMIN — DONEPEZIL HYDROCHLORIDE 5 MG: 5 TABLET, FILM COATED ORAL at 22:25

## 2021-06-19 RX ADMIN — AMLODIPINE BESYLATE 10 MG: 10 TABLET ORAL at 16:07

## 2021-06-19 RX ADMIN — HYDROCHLOROTHIAZIDE 25 MG: 25 TABLET ORAL at 16:08

## 2021-06-19 RX ADMIN — HEPARIN SODIUM 3900 UNITS: 1000 INJECTION INTRAVENOUS; SUBCUTANEOUS at 06:13

## 2021-06-19 RX ADMIN — HEPARIN SODIUM 12 UNITS/KG/HR: 10000 INJECTION, SOLUTION INTRAVENOUS at 06:17

## 2021-06-19 RX ADMIN — WATER 2.1 ML: 1 INJECTION INTRAMUSCULAR; INTRAVENOUS; SUBCUTANEOUS at 03:05

## 2021-06-19 RX ADMIN — SODIUM CHLORIDE 1000 ML: 0.9 INJECTION, SOLUTION INTRAVENOUS at 03:06

## 2021-06-19 RX ADMIN — SODIUM CHLORIDE 125 ML/HR: 0.9 INJECTION, SOLUTION INTRAVENOUS at 16:06

## 2021-06-19 RX ADMIN — HEPARIN SODIUM 5000 UNITS: 5000 INJECTION INTRAVENOUS; SUBCUTANEOUS at 22:28

## 2021-06-19 RX ADMIN — MONTELUKAST SODIUM 10 MG: 10 TABLET, FILM COATED ORAL at 22:25

## 2021-06-19 RX ADMIN — HYDROXYZINE HYDROCHLORIDE 25 MG: 25 TABLET ORAL at 04:47

## 2021-06-19 RX ADMIN — IOHEXOL 100 ML: 350 INJECTION, SOLUTION INTRAVENOUS at 03:35

## 2021-06-19 RX ADMIN — HEPARIN SODIUM 5000 UNITS: 5000 INJECTION INTRAVENOUS; SUBCUTANEOUS at 16:07

## 2021-06-19 RX ADMIN — RISPERIDONE 0.5 MG: 0.25 TABLET ORAL at 18:17

## 2021-06-19 RX ADMIN — HALOPERIDOL LACTATE 5 MG: 5 INJECTION, SOLUTION INTRAMUSCULAR at 04:47

## 2021-06-19 NOTE — ASSESSMENT & PLAN NOTE
82year old male with past medical history of Alzheimer's dementia, hypertension, COVID-19 infection, hyperlipidemia, was found by    Lucius stated that patient had wonder off form his house  On further evaluation patient noted with leukocytosis, lactic acidosis, tachycardia, tachypnea without clear source of infection  Currently heart rate better, blood pressure stable  No clear source of infection noted  Lactic acidosis now resolved after hydration  Troponin slightly elevated 0 8 x 2    Currently patient is stable, denies chest pain, dyspnea  (nursing staff helped with interpretation)  Currently patient is hemodynamically stable  Discontinue IV heparin drip  Continue supportive care  Continue IV hydration  Maintained delirium precaution  Continue to monitor off antibiotics  Follow-up with pending blood cultures  Family is agreeable with above plan  Consider starting on broad-spectrum antibiotics if develops fever and worsening signs of infection

## 2021-06-19 NOTE — CONSULTS
Consultation - Cardiology   Tom Vance 82 y o  male MRN: [id number]  Unit/Bed#: ED 25 Encounter: [id number]  06/19/21  12:11 PM    Assessment/ Plan:  1  Elevated troponin  - troponin 0 02 > 0 08 > 0 08   - likely elevated secondary to #2  - EKG - NSR, PVCs, inferior infarct   - echo 3/4/21 at [facility] - EF 55-60%, no regional wall motion abnormalities, grade 1 mild diastolic dysfunction  - limited echo ordered for tomorrow to evaluate LVEF  - recommend d/c'ing heparin drip as elevated troponin is unlikely ACS  - patient asymptomatic  - can resume home aspirin  - continue to monitor on telemetry    2  Sepsis  - as evidenced by elevated lactic, tachycardia, tachypnea, and leukocytosis  - unknown source of infection  - UA negative  - blood cultures pending  - management per primary team    3  Hypertension  - last documented /72; stable  - continue home antihypertensives - amlodipine, lisinopril, hydrochlorothiazide  - continue to monitor    4  Type 2 diabetes  - last hemoglobin A1c 5 8  - management per primary team    5  Dementia  - received Haldol, Atarax, and Zyprexa in ED for agitation  - management per primary team    History of Present Illness   Physician Requesting Consult: Valeria Hunt MD    Reason for Consult / Principal Problem:  Elevated troponin    HPI: Tom Vance is a 82y o  year old male with past medical history significant for hypertension, type 2 diabetes, and dementia who presents to the ED with police after he was found out front of the police station sitting in the grass  Police note that he was diaphoretic and was unable to stand up  However, patient walked into the ED with no assistance  Upon arrival to the ED, patient was noted to be septic as evidenced by lactic acidosis, leukocytosis, tachycardia, and tachypnea  Patient was also noted to have a slightly elevated troponin for which Cardiology was consulted    Upon examination, patient appears to be a significantly poor historian due to dementia  We were able to communicate with him with a nurse who translated his Yoruba for us  Currently, the patient is complaining of some right lower abdominal pain and pain at the IV insertion sites  He denies any chest pain, shortness of breath, diaphoresis, palpitations, nausea, vomiting, fever, or chills  Patient notes that he has a sore throat  Unfortunately, there is no family at bedside to provide further history  Inpatient consult to Cardiology  Consult performed by: TRACY Fine  Consult ordered by: Douglas Basilio MD      EKG:  EKG - NSR, PVCs, inferior infarct     Review of Systems   Unable to perform ROS: Dementia     Historical Information   Past Medical History:   Diagnosis Date    D[facility] ([facility]     Diabetes mellitus ([facility]     Hypertension     Prostate CA ([facility] )      History reviewed  No pertinent surgical history  Social History     Substance and Sexual Activity   Alcohol Use Never     Social History     Substance and Sexual Activity   Drug Use Not on file     Social History     Tobacco Use   Smoking Status Never Smoker   Smokeless Tobacco Never Used       Family History: History reviewed  No pertinent family history  Meds/Allergies   all current active meds have been reviewed  No Known Allergies    Objective   Vitals: Blood pressure 132/72, pulse 79, temperature 97 6 °F (36 4 °C), temperature source Oral, resp  rate 16, SpO2 95 %  , There is no height or weight on file to calculate BMI ,   Orthostatic Blood Pressures      Most Recent Value   Blood Pressure  132/72 filed at 06/19/2021 0724   Patient Position - Orthostatic VS  Lying filed at 06/19/2021 9691        Systolic (35HBK), YDM:909 , Min:118 , QWN:266     Diastolic (02XUI), TENNILLE:98, Min:59, Max:113      Intake/Output Summary (Last 24 hours) at 6/19/2021 1211  Last data filed at 6/19/2021 0728  Gross per 24 hour   Intake 800 ml   Output 425 ml   Net 375 ml       Invasive Devices     Peripheral Intravenous Line            Peripheral IV 06/19/21 Left Hand <1 day    Peripheral IV 06/19/21 Right Antecubital <1 day                Physical Exam:  GEN: Alert and oriented to self, in no acute distress  HEENT: Sclera anicteric, conjunctivae pink, mucous membranes moist  Oropharynx clear  NECK: Supple, no carotid bruits, no significant JVD  Trachea midline, no thyromegaly  HEART: Regular rhythm, normal S1 and S2, no murmurs, clicks, gallops or rubs  PMI nondisplaced, no thrills  LUNGS: Clear to auscultation bilaterally; no wheezes, rales, or rhonchi  No increased work of breathing or signs of respiratory distress  ABDOMEN: Soft, nontender, nondistended, normoactive bowel sounds  EXTREMITIES: Skin warm and well perfused, no clubbing, cyanosis, or edema  NEURO: No focal findings  Normal speech  Mood and affect normal    SKIN: Normal without suspicious lesions on exposed skin      Lab Results:   Troponins:   Results from last 7 days   Lab Units 06/19/21  0801 06/19/21  0416 06/19/21  0126   TROPONIN I ng/mL 0 08* 0 08* <0 02     CBC with diff:   Results from last 7 days   Lab Units 06/19/21  0126   WBC Thousand/uL 12 68*   HEMOGLOBIN g/dL 12 4   HEMATOCRIT % 39 4   MCV fL 80*   PLATELETS Thousands/uL 285   MCH pg 25 3*   MCHC g/dL 31 5   RDW % 15 3*   MPV fL 9 8   NRBC AUTO /100 WBCs 0     CMP:   Results from last 7 days   Lab Units 06/19/21  0126   POTASSIUM mmol/L 4 0   CHLORIDE mmol/L 104   CO2 mmol/L 29   BUN mg/dL 13   CREATININE mg/dL 1 41*   CALCIUM mg/dL 9 2   AST U/L 19   ALT U/L 26   ALK PHOS U/L 81   EGFR ml/min/1 73sq m 45

## 2021-06-19 NOTE — H&P
[address]  1935, 82 y o  male MRN: [id number]  Unit/Bed#: -01 Encounter: [id number]  Primary Care Provider: Ernie Pedroza PA-C   Date and time admitted to hospital: 6/19/2021 12:28 AM    * SIRS (systemic inflammatory response syndrome) [facility])  Assessment & Plan  82year old male with past medical history of Alzheimer's dementia, hypertension, COVID-19 infection, hyperlipidemia, was found by   Janene Haile stated that patient had wonder off form his house  On further evaluation patient noted with leukocytosis, lactic acidosis, tachycardia, tachypnea without clear source of infection  Currently heart rate better, blood pressure stable  No clear source of infection noted  Lactic acidosis now resolved after hydration  Troponin slightly elevated 0 8 x 2    Currently patient is stable, denies chest pain, dyspnea  (nursing staff helped with interpretation)  Currently patient is hemodynamically stable  Discontinue IV heparin drip  Continue supportive care  Continue IV hydration  Maintained delirium precaution  Continue to monitor off antibiotics  Follow-up with pending blood cultures  Family is agreeable with above plan  Consider starting on broad-spectrum antibiotics if develops fever and worsening signs of infection  Elevated troponin  Assessment & Plan  Noted with troponin 0 08 x 2  Chest x-ray report noted negative for acute hemorrhage  Currently patient is IV heparin drip but elevated troponin less likely to be cardiac in origin  Will discontinue heparin drip  Follow-up with 2D echo  Follow-up with cardiology recommendation  History of COVID-19  Assessment & Plan  Patient has history of COVID-19 infection approximately 2 months ago  CT PE study report noted negative for pulmonary embolism, noted with atelectasis  Continue supportive care      Alzheimer's dementia [facility])  Assessment & Plan  Present admission history of Alzheimer's dementia  Resume home dose of Aricept, risperidone, Seroquel  Maintain delirium precaution  HTN (hypertension)  Assessment & Plan  Present on admission history of hypertension  Currently controlled  Resume home dose of Norvasc, hydrochlorothiazide, lisinopril with holding parameters  VTE Pharmacologic Prophylaxis: VTE Score: 4 Moderate Risk (Score 3-4) - Pharmacological DVT Prophylaxis Ordered: heparin  Code Status: Level 1 - Full Code   Discussion with family: Spoke with humaira  Ayalajacob Gina over the phone  Anticipated Length of Stay: Patient will be admitted on an observation basis with an anticipated length of stay of less than 2 midnights secondary to Sirs of unclear etiology  Total Time for Visit, including Counseling / Coordination of Care: 60 minutes Greater than 50% of this total time spent on direct patient counseling and coordination of care  Chief Complaint: AMS    History of Present Illness:  Vanessa Sawyer is a 82 y o  male with a PMH of history of Alzheimer's dementia, hypertension, recent history of COVID-19 infection, who was brought into the emergency room by officer  Pt was found in front of the police station sitting in the grass and he was diaphoretic and could not stand up as per the report  Patient is a poor historian  History obtained from reviewing the charts and talking to humaira Fuentes  According to Adia Fuentes patient has history of Alzheimer's dementia and he must have wonder off form home  On my encounter patient is awake, alert, disoriented, poor historian  Patient is primary Bengali speaker and nursing staff helped with interpretation  Denies chest pain, dyspnea, nausea vomiting, abdominal pain, dysuria, diarrhea  No other events reported  He was found to have tachycardia, tachypnea, lactic acidosis with mild leukocytosis and flat elevated troponin 0 08 x 2, creatinine 1 41  Clinically patient is acutely nontoxic appearing and in doing better        Review of Systems:  Review of Systems   Unable to perform ROS: Dementia       Past Medical and Surgical History:   Past Medical History:   Diagnosis Date    Dementia ([facility] )     Diabetes mellitus ([facility] )     Hypertension     Prostate CA ([facility] )        History reviewed  No pertinent surgical history  Meds/Allergies:  Prior to Admission medications    Medication Sig Start Date End Date Taking? Authorizing Provider   amLODIPine (NORVASC) 10 mg tablet Take 10 mg by mouth daily   Yes Historical Provider, MD   aspirin (ECOTRIN LOW STRENGTH) 81 mg EC tablet Take 81 mg by mouth daily   Yes Historical Provider, MD   Cholecalciferol (VITAMIN D3) 2000 units TABS Take 2,000 Units by mouth daily   Yes Historical Provider, MD   donepezil (ARICEPT) 5 mg tablet Take 5 mg by mouth daily at bedtime   Yes Historical Provider, MD   hydrochlorothiazide (HYDRODIURIL) 25 mg tablet Take 25 mg by mouth daily   Yes Historical Provider, MD   lisinopril (ZESTRIL) 20 mg tablet Take 20 mg by mouth daily   Yes Historical Provider, MD   montelukast (SINGULAIR) 10 mg tablet Take 1 tablet (10 mg total) by mouth daily at bedtime 9/15/20  Yes Myrtle Rinaldi MD   risperiDONE (RisperDAL) 0 5 mg tablet Take 0 5 mg by mouth 2 (two) times a day   Yes Historical Provider, MD   lidocaine (LIDODERM) 5 % Apply 1 patch topically daily Remove & Discard patch within 12 hours or as directed by MD 2/21/19   Christian Poe MD     I have reviewed home medications with a medical source (PCP, Pharmacy, other)  Allergies: No Known Allergies    Social History:  Marital Status: /Civil Union   Patient Pre-hospital Living Situation:  Family member    Substance Use History:   Social History     Substance and Sexual Activity   Alcohol Use Never     Social History     Tobacco Use   Smoking Status Never Smoker   Smokeless Tobacco Never Used     Social History     Substance and Sexual Activity   Drug Use Not on file       Family History:  History reviewed   No pertinent family history  Physical Exam:     Vitals:   Blood Pressure: 148/81 (06/19/21 1751)  Pulse: 68 (06/19/21 1751)  Temperature: 98 °F (36 7 °C) (06/19/21 1751)  Temp Source: Oral (06/19/21 0724)  Respirations: 16 (06/19/21 1542)  SpO2: 96 % (06/19/21 1751)    Physical Exam  Constitutional:       General: He is not in acute distress  Appearance: Normal appearance  He is not ill-appearing  Comments: Elderly male patient, acutely nontoxic appearing  HENT:      Head: Normocephalic and atraumatic  Eyes:      Extraocular Movements: Extraocular movements intact  Pupils: Pupils are equal, round, and reactive to light  Cardiovascular:      Rate and Rhythm: Normal rate and regular rhythm  Pulses: Normal pulses  Heart sounds: Normal heart sounds  Pulmonary:      Effort: Pulmonary effort is normal  No respiratory distress  Breath sounds: Normal breath sounds  No stridor  Abdominal:      General: Bowel sounds are normal  There is no distension  Palpations: Abdomen is soft  Tenderness: There is no abdominal tenderness  Musculoskeletal:      Cervical back: Normal range of motion and neck supple  No rigidity  Skin:     Findings: No rash  Neurological:      General: No focal deficit present  Mental Status: He is alert  Mental status is at baseline     Psychiatric:         Behavior: Behavior normal          Additional Data:     Lab Results:  Results from last 7 days   Lab Units 06/19/21  0126   WBC Thousand/uL 12 68*   HEMOGLOBIN g/dL 12 4   HEMATOCRIT % 39 4   PLATELETS Thousands/uL 285   NEUTROS PCT % 81*   LYMPHS PCT % 11*   MONOS PCT % 7   EOS PCT % 1     Results from last 7 days   Lab Units 06/19/21  0126   SODIUM mmol/L 141   POTASSIUM mmol/L 4 0   CHLORIDE mmol/L 104   CO2 mmol/L 29   BUN mg/dL 13   CREATININE mg/dL 1 41*   ANION GAP mmol/L 8   CALCIUM mg/dL 9 2   ALBUMIN g/dL 3 5   TOTAL BILIRUBIN mg/dL 0 26   ALK PHOS U/L 81   ALT U/L 26   AST U/L 19   GLUCOSE RANDOM mg/dL 117     Results from last 7 days   Lab Units 06/19/21  0609   INR  1 09             Results from last 7 days   Lab Units 06/19/21  1300 06/19/21  0416 06/19/21  0126   LACTIC ACID mmol/L 1 4 2 1* 3 1*       Imaging: Reviewed radiology reports from this admission including: CTA PE study, CT cervical spine report, CT head report noted  CT head without contrast   ED Interpretation by Ashley Sterling DO (06/19 0401)   1  No acute intracranial hemorrhage, midline shift, or mass effect  2   Advanced chronic small vessel ischemic changes  Final Result by Sunshine Espitia MD (06/19 0345)      1  No acute intracranial hemorrhage, midline shift, or mass effect  2   Advanced chronic small vessel ischemic changes  Workstation performed: [id number]         CT spine cervical without contrast   ED Interpretation by Ashley Sterling DO (06/19 0401)   No cervical spine fracture or traumatic malalignment  Final Result by Sunshine Espitia MD (06/19 0345)      No cervical spine fracture or traumatic malalignment  Workstation performed: [id number]         CT pe study w abdomen pelvis w contrast   ED Interpretation by Ashley Sterling DO (06/19 0405)   1  Hypoventilatory changes of the lungs scattered areas of atelectasis  2   6 mm nodule in the right middle lobe  12 month follow-up non-contrast chest CT is recommended or as per oncology follow-up  3   Coronary artery calcifications  4   Scattered colonic diverticula without evidence of diverticulitis  5   Marked enlargement of the prostate  6   Large right and small left fat-containing right inguinal hernias  Final Result by Sunshine Espitia MD (06/19 0400)      1  Hypoventilatory changes of the lungs scattered areas of atelectasis  2   6 mm nodule in the right middle lobe  12 month follow-up non-contrast chest CT is recommended or as per oncology follow-up  3   Coronary artery calcifications     4  Scattered colonic diverticula without evidence of diverticulitis  5   Marked enlargement of the prostate  6   Large right and small left fat-containing right inguinal hernias  Workstation performed: [id number]             EKG and Other Studies Reviewed on Admission:   · EKG:  Sinus rhythm with occasional PVC, nonspecific ST-T changes noted  ** Please Note: This note has been constructed using a voice recognition system   **

## 2021-06-19 NOTE — ASSESSMENT & PLAN NOTE
Noted with troponin 0 08 x 2  Chest x-ray report noted negative for acute hemorrhage  Currently patient is IV heparin drip but elevated troponin less likely to be cardiac in origin  Will discontinue heparin drip  Follow-up with 2D echo  Follow-up with cardiology recommendation

## 2021-06-19 NOTE — ED NOTES
Pt wants to leave  Is trying to get up  Daughter states he will sign ama form         Constance Balderrama, PEDRO  06/19/21 2025

## 2021-06-19 NOTE — ASSESSMENT & PLAN NOTE
Patient has history of COVID-19 infection approximately 2 months ago  CT PE study report noted negative for pulmonary embolism, noted with atelectasis  Continue supportive care

## 2021-06-19 NOTE — ASSESSMENT & PLAN NOTE
Present on admission history of hypertension  Currently controlled  Resume home dose of Norvasc, hydrochlorothiazide, lisinopril with holding parameters

## 2021-06-19 NOTE — PLAN OF CARE
Problem: NEUROSENSORY - ADULT  Goal: Achieves stable or improved neurological status  Description: INTERVENTIONS  - Monitor and report changes in neurological status  - Monitor vital signs such as temperature, blood pressure, glucose, and any other labs ordered   - Initiate measures to prevent increased intracranial pressure  - Monitor for seizure activity and implement precautions if appropriate      Outcome: Progressing  Goal: Achieves maximal functionality and self care  Description: INTERVENTIONS  - Monitor swallowing and airway patency with patient fatigue and changes in neurological status  - Encourage and assist patient to increase activity and self care     - Encourage visually impaired, hearing impaired and aphasic patients to use assistive/communication devices  Outcome: Progressing     Problem: MUSCULOSKELETAL - ADULT  Goal: Maintain or return mobility to safest level of function  Description: INTERVENTIONS:  - Assess patient's ability to carry out ADLs; assess patient's baseline for ADL function and identify physical deficits which impact ability to perform ADLs (bathing, care of mouth/teeth, toileting, grooming, dressing, etc )  - Assess/evaluate cause of self-care deficits   - Assess range of motion  - Assess patient's mobility  - Assess patient's need for assistive devices and provide as appropriate  - Encourage maximum independence but intervene and supervise when necessary  - Involve family in performance of ADLs  - Assess for home care needs following discharge   - Consider OT consult to assist with ADL evaluation and planning for discharge  - Provide patient education as appropriate  Outcome: Progressing  Goal: Maintain proper alignment of affected body part  Description: INTERVENTIONS:  - Support, maintain and protect limb and body alignment  - Provide patient/ family with appropriate education  Outcome: Progressing

## 2021-06-19 NOTE — ED PROVIDER NOTES
History  Chief Complaint   Patient presents with    Medical Problem     Pt arrived to the ED with PSP  Officer states that pt was found in front of police station, sitting in the grass and that he was diaphoretic and couldn't stand up  Pt walked into the ED with no assistance  61-year-old male presents with past medical history of Alzheimer's dementia, hypertension, recent COVID infection  History limited because he is a poor historian 2/2 dementia  Nursing staff and family assist me with interpretation  Patient is brought to the emergency department after the police department called emergency medical services for the patient  He was found outside of the police dept diaphoretic unable to get up  He denies CP, SOB, F/C, N/V/D/C but he has dementia so unsure if he was having sx at the time  He presents w tachycardiac, tachypnea  Diaphoresis has resolved  Prior to Admission Medications   Prescriptions Last Dose Informant Patient Reported? Taking?    Cholecalciferol (VITAMIN D3) 2000 units TABS  Child Yes Yes   Sig: Take 2,000 Units by mouth daily   amLODIPine (NORVASC) 10 mg tablet  Child Yes Yes   Sig: Take 10 mg by mouth daily   aspirin (ECOTRIN LOW STRENGTH) 81 mg EC tablet  Child Yes Yes   Sig: Take 81 mg by mouth daily   donepezil (ARICEPT) 5 mg tablet  Child Yes Yes   Sig: Take 5 mg by mouth daily at bedtime   hydrochlorothiazide (HYDRODIURIL) 25 mg tablet  Child Yes Yes   Sig: Take 25 mg by mouth daily   lidocaine (LIDODERM) 5 %   No No   Sig: Apply 1 patch topically daily Remove & Discard patch within 12 hours or as directed by MD   lisinopril (ZESTRIL) 20 mg tablet  Child Yes Yes   Sig: Take 20 mg by mouth daily   montelukast (SINGULAIR) 10 mg tablet   No Yes   Sig: Take 1 tablet (10 mg total) by mouth daily at bedtime   risperiDONE (RisperDAL) 0 5 mg tablet  Child Yes Yes   Sig: Take 0 5 mg by mouth 2 (two) times a day      Facility-Administered Medications: None       Past Medical History:   Diagnosis Date    Dementia ([facility] )     Diabetes mellitus ([facility] )     Hypertension     Prostate CA ([facility] )        History reviewed  No pertinent surgical history  History reviewed  No pertinent family history  I have reviewed and agree with the history as documented  E-Cigarette/Vaping     E-Cigarette/Vaping Substances     Social History     Tobacco Use    Smoking status: Never Smoker    Smokeless tobacco: Never Used   Substance Use Topics    Alcohol use: Never    Drug use: Not on file       Review of Systems   Reason unable to perform ROS: dementia  Physical Exam  Physical Exam  Vitals reviewed  Constitutional:       General: He is not in acute distress  Appearance: He is well-developed  He is not ill-appearing, toxic-appearing or diaphoretic  HENT:      Head: Normocephalic and atraumatic  Eyes:      General: No scleral icterus  Right eye: No discharge  Left eye: No discharge  Conjunctiva/sclera: Conjunctivae normal       Pupils: Pupils are equal, round, and reactive to light  Neck:      Vascular: No JVD  Cardiovascular:      Rate and Rhythm: Regular rhythm  Tachycardia present  Heart sounds: Normal heart sounds  No murmur heard  No friction rub  No gallop  Pulmonary:      Effort: No respiratory distress  Breath sounds: Normal breath sounds  No wheezing or rales  Comments: tachypnea  Chest:      Chest wall: No tenderness  Abdominal:      General: Bowel sounds are normal  There is no distension  Palpations: Abdomen is soft  Tenderness: There is no abdominal tenderness  There is no right CVA tenderness, left CVA tenderness or guarding  Musculoskeletal:         General: No tenderness or deformity  Normal range of motion  Cervical back: Normal range of motion and neck supple  No rigidity  Right lower leg: No edema  Left lower leg: No edema  Skin:     General: Skin is warm and dry        Coloration: Skin is not pale       Findings: No erythema or rash  Neurological:      Mental Status: He is alert and oriented to person, place, and time  Cranial Nerves: No cranial nerve deficit  Psychiatric:         Behavior: Behavior normal       Comments: Baseline, dementia           Vital Signs  ED Triage Vitals [06/19/21 0029]   Temperature Pulse Respirations Blood Pressure SpO2   99 8 °F (37 7 °C) (!) 109 18 132/86 97 %      Temp Source Heart Rate Source Patient Position - Orthostatic VS BP Location FiO2 (%)   Oral Monitor Lying Right arm --      Pain Score       No Pain           Vitals:    06/20/21 1854 06/20/21 2234 06/21/21 0714 06/21/21 0933   BP: 134/84 143/86 135/85    Pulse:  82 78 85   Patient Position - Orthostatic VS:  Lying Lying          Visual Acuity      ED Medications  Medications   OLANZapine (ZyPREXA) IM injection 10 mg (10 mg Intramuscular Given 6/19/21 0305)   sodium chloride 0 9 % bolus 1,000 mL (0 mL Intravenous Stopped 6/19/21 0412)   sterile water injection **ADS Override Pull** (2 1 mL  Given 6/19/21 0305)   iohexol (OMNIPAQUE) 350 MG/ML injection (SINGLE-DOSE) 100 mL (100 mL Intravenous Given 6/19/21 0335)   hydrOXYzine HCL (ATARAX) tablet 25 mg (25 mg Oral Given 6/19/21 0447)   haloperidol lactate (HALDOL) injection 5 mg (5 mg Intramuscular Given 6/19/21 0447)   heparin (porcine) injection 3,900 Units (3,900 Units Intravenous Given 6/19/21 0613)   potassium chloride (K-DUR,KLOR-CON) CR tablet 40 mEq (40 mEq Oral Given 6/20/21 0844)   potassium chloride (K-DUR,KLOR-CON) CR tablet 40 mEq (40 mEq Oral Given 6/21/21 0901)       Diagnostic Studies  Results Reviewed     Procedure Component Value Units Date/Time    Blood culture [[id number]] Collected: 06/19/21 0125    Lab Status: Final result Specimen: Blood from Arm, Left Updated: 06/24/21 1301     Blood Culture No Growth After 5 Days      Blood culture [[id number]] Collected: 06/19/21 0125    Lab Status: Final result Specimen: Blood from Arm, Right Updated: 06/24/21 1301     Blood Culture No Growth After 5 Days      Procalcitonin [[id number]]  (Normal) Collected: 06/20/21 0544    Lab Status: Final result Specimen: Blood from Arm, Left Updated: 06/20/21 1532     Procalcitonin <0 05 ng/ml     Basic metabolic panel [[id number]]  (Abnormal) Collected: 06/20/21 0544    Lab Status: Final result Specimen: Blood from Arm, Left Updated: 06/20/21 2096     Sodium 140 mmol/L      Potassium 3 5 mmol/L      Chloride 104 mmol/L      CO2 27 mmol/L      ANION GAP 9 mmol/L      BUN 13 mg/dL      Creatinine 1 26 mg/dL      Glucose 100 mg/dL      Glucose, Fasting 100 mg/dL      Calcium 8 9 mg/dL      eGFR 52 ml/min/1 73sq m     Narrative:      [place] guidelines for Chronic Kidney Disease (CKD):     Stage 1 with normal or high GFR (GFR > 90 mL/min/1 73 square meters)    Stage 2 Mild CKD (GFR = 60-89 mL/min/1 73 square meters)    Stage 3A Moderate CKD (GFR = 45-59 mL/min/1 73 square meters)    Stage 3B Moderate CKD (GFR = 30-44 mL/min/1 73 square meters)    Stage 4 Severe CKD (GFR = 15-29 mL/min/1 73 square meters)    Stage 5 End Stage CKD (GFR <15 mL/min/1 73 square meters)  Note: GFR calculation is accurate only with a steady state creatinine    CBC and differential [[id number]]  (Abnormal) Collected: 06/20/21 0544    Lab Status: Final result Specimen: Blood from Arm, Left Updated: 06/20/21 0557     WBC 10 82 Thousand/uL      RBC 5 06 Million/uL      Hemoglobin 12 9 g/dL      Hematocrit 40 6 %      MCV 80 fL      MCH 25 5 pg      MCHC 31 8 g/dL      RDW 15 7 %      MPV 9 6 fL      Platelets 208 Thousands/uL      nRBC 0 /100 WBCs      Neutrophils Relative 66 %      Immat GRANS % 1 %      Lymphocytes Relative 20 %      Monocytes Relative 9 %      Eosinophils Relative 3 %      Basophils Relative 1 %      Neutrophils Absolute 7 39 Thousands/µL      Immature Grans Absolute 0 05 Thousand/uL      Lymphocytes Absolute 2 11 Thousands/µL      Monocytes Absolute 0 92 Thousand/µL      Eosinophils Absolute 0 30 Thousand/µL      Basophils Absolute 0 05 Thousands/µL     Troponin I [[id number]]  (Normal) Collected: 06/19/21 1539    Lab Status: Final result Specimen: Blood from Arm, Right Updated: 06/19/21 1621     Troponin I 0 04 ng/mL     APTT [[id number]]  (Abnormal) Collected: 06/19/21 1259    Lab Status: Final result Specimen: Blood from Arm, Right Updated: 06/19/21 1420     PTT 87 seconds     Lactic acid, plasma [[id number]]  (Normal) Collected: 06/19/21 1300    Lab Status: Final result Specimen: Blood from Arm, Right Updated: 06/19/21 1329     LACTIC ACID 1 4 mmol/L     Narrative:      Result may be elevated if tourniquet was used during collection  Troponin I [[id number]]  (Abnormal) Collected: 06/19/21 0801    Lab Status: Final result Specimen: Blood from Arm, Right Updated: 06/19/21 0823     Troponin I 0 08 ng/mL     APTT six (6) hours after Heparin bolus/drip initiation or dosing change [[id number]]  (Normal) Collected: 06/19/21 0609    Lab Status: Final result Specimen: Blood from Arm, Left Updated: 06/19/21 0623     PTT 34 seconds     Protime-INR [[id number]]  (Normal) Collected: 06/19/21 0609    Lab Status: Final result Specimen: Blood from Arm, Left Updated: 06/19/21 0623     Protime 14 3 seconds      INR 1 09    Lactic acid 2 Hours [[id number]]  (Abnormal) Collected: 06/19/21 0416    Lab Status: Final result Specimen: Blood from Arm, Left Updated: 06/19/21 0529     LACTIC ACID 2 1 mmol/L     Narrative:      Result may be elevated if tourniquet was used during collection      Troponin I [[id number]]  (Abnormal) Collected: 06/19/21 0416    Lab Status: Final result Specimen: Blood from Arm, Left Updated: 06/19/21 0454     Troponin I 0 08 ng/mL     Lactic acid, plasma [[id number]]  (Abnormal) Collected: 06/19/21 0126    Lab Status: Final result Specimen: Blood from Arm, Left Updated: 06/19/21 0210     LACTIC ACID 3 1 mmol/L     Narrative:      Result may be elevated if tourniquet was used during collection      Comprehensive metabolic panel [[id number]]  (Abnormal) Collected: 06/19/21 0126    Lab Status: Final result Specimen: Blood from Arm, Left Updated: 06/19/21 0202     Sodium 141 mmol/L      Potassium 4 0 mmol/L      Chloride 104 mmol/L      CO2 29 mmol/L      ANION GAP 8 mmol/L      BUN 13 mg/dL      Creatinine 1 41 mg/dL      Glucose 117 mg/dL      Calcium 9 2 mg/dL      AST 19 U/L      ALT 26 U/L      Alkaline Phosphatase 81 U/L      Total Protein 7 4 g/dL      Albumin 3 5 g/dL      Total Bilirubin 0 26 mg/dL      eGFR 45 ml/min/1 73sq m     Narrative:      [place] guidelines for Chronic Kidney Disease (CKD):     Stage 1 with normal or high GFR (GFR > 90 mL/min/1 73 square meters)    Stage 2 Mild CKD (GFR = 60-89 mL/min/1 73 square meters)    Stage 3A Moderate CKD (GFR = 45-59 mL/min/1 73 square meters)    Stage 3B Moderate CKD (GFR = 30-44 mL/min/1 73 square meters)    Stage 4 Severe CKD (GFR = 15-29 mL/min/1 73 square meters)    Stage 5 End Stage CKD (GFR <15 mL/min/1 73 square meters)  Note: GFR calculation is accurate only with a steady state creatinine    Troponin I [[id number]]  (Normal) Collected: 06/19/21 0126    Lab Status: Final result Specimen: Blood from Arm, Left Updated: 06/19/21 0154     Troponin I <0 02 ng/mL     Urine Microscopic [[id number]]  (Normal) Collected: 06/19/21 0140    Lab Status: Final result Specimen: Urine, Clean Catch Updated: 06/19/21 0152     RBC, UA 1-2 /hpf      WBC, UA 0-1 /hpf      Epithelial Cells Occasional /hpf      Bacteria, UA Occasional /hpf     UA w Reflex to Microscopic w Reflex to Culture [[id number]]  (Abnormal) Collected: 06/19/21 0140    Lab Status: Final result Specimen: Urine, Clean Catch Updated: 06/19/21 0144     Color, UA Yellow     Clarity, UA Clear     Specific [place], UA 1 020     pH, UA 5 5     Leukocytes, UA Negative     Nitrite, UA Negative     Protein, UA Trace mg/dl      Glucose, UA Negative mg/dl      Ketones, UA Negative mg/dl      Urobilinogen, UA 0 2 E U /dl      Bilirubin, UA Negative     Blood, UA Trace-Intact    CBC and differential [[id number]]  (Abnormal) Collected: 06/19/21 0126    Lab Status: Final result Specimen: Blood from Arm, Left Updated: 06/19/21 0138     WBC 12 68 Thousand/uL      RBC 4 90 Million/uL      Hemoglobin 12 4 g/dL      Hematocrit 39 4 %      MCV 80 fL      MCH 25 3 pg      MCHC 31 5 g/dL      RDW 15 3 %      MPV 9 8 fL      Platelets 133 Thousands/uL      nRBC 0 /100 WBCs      Neutrophils Relative 81 %      Immat GRANS % 0 %      Lymphocytes Relative 11 %      Monocytes Relative 7 %      Eosinophils Relative 1 %      Basophils Relative 0 %      Neutrophils Absolute 10 25 Thousands/µL      Immature Grans Absolute 0 04 Thousand/uL      Lymphocytes Absolute 1 45 Thousands/µL      Monocytes Absolute 0 83 Thousand/µL      Eosinophils Absolute 0 07 Thousand/µL      Basophils Absolute 0 04 Thousands/µL                  CT head without contrast   ED Interpretation by Lu Newsome DO (06/19 0401)   1  No acute intracranial hemorrhage, midline shift, or mass effect  2   Advanced chronic small vessel ischemic changes  Final Result by Alex Lantigua MD (06/19 0345)      1  No acute intracranial hemorrhage, midline shift, or mass effect  2   Advanced chronic small vessel ischemic changes  Workstation performed: [id number]         CT spine cervical without contrast   ED Interpretation by Lu Newsome DO (06/19 0401)   No cervical spine fracture or traumatic malalignment  Final Result by Alex Lantigua MD (06/19 0345)      No cervical spine fracture or traumatic malalignment  Workstation performed: [id number]         CT pe study w abdomen pelvis w contrast   ED Interpretation by Lu Newsome DO (06/19 0405)   1  Hypoventilatory changes of the lungs scattered areas of atelectasis    2   6 mm nodule in the right middle lobe  12 month follow-up non-contrast chest CT is recommended or as per oncology follow-up  3   Coronary artery calcifications  4   Scattered colonic diverticula without evidence of diverticulitis  5   Marked enlargement of the prostate  6   Large right and small left fat-containing right inguinal hernias  Final Result by Megan Henriquez MD (06/19 0400)      1  Hypoventilatory changes of the lungs scattered areas of atelectasis  2   6 mm nodule in the right middle lobe  12 month follow-up non-contrast chest CT is recommended or as per oncology follow-up  3   Coronary artery calcifications  4   Scattered colonic diverticula without evidence of diverticulitis  5   Marked enlargement of the prostate  6   Large right and small left fat-containing right inguinal hernias  Workstation performed: [id number]                    Procedures  Procedures   EKG: sinus rhythm w occasional PVCs  Old inferior infact  No signs of new ischemia      ED Course  ED Course as of Jul 07 0948   Sat Jun 19, 2021   0227 LACTIC ACID(!!): 3 1   0554 Troponin I(!): 0 08   0557 TT sent to [facility] for admission                                SBIRT 20yo+      Most Recent Value   SBIRT (23 yo +)   In order to provide better care to our patients, we are screening all of our patients for alcohol and drug use  Would it be okay to ask you these screening questions? Unable to answer at this time Filed at: 06/19/2021 0648                    MDM  Number of Diagnoses or Management Options  Diaphoresis  NSTEMI (non-ST elevated myocardial infarction) [facility])  Diagnosis management comments: Diaphoresis - concern for infection, cardiac event, gi illness, infection, among others - broad DDX and difficult to narrow down because of dementia  On work up, patient found to have tachycardia, tachypnea, elevated WBC and increased lactic acid  Mild elevated troponin    Admitted for nstemi and questionable infection w unknown source  Patient and family agreeable to admission  Amount and/or Complexity of Data Reviewed  Clinical lab tests: ordered and reviewed  Tests in the radiology section of CPT®: ordered and reviewed        Disposition  Final diagnoses:   NSTEMI (non-ST elevated myocardial infarction) ([facility] )   Diaphoresis     Time reflects when diagnosis was documented in both MDM as applicable and the Disposition within this note     Time User Action Codes Description Comment    6/19/2021  6:09 AM Coppersmith, Lazaro Grist Add [I21 4] NSTEMI (non-ST elevated myocardial infarction) ([facility] )     6/19/2021  6:10 AM Coppersmith, Lazaro Grist Add Lorna Vivian Diaphoresis     6/20/2021  6:08 AM Manfre, Lg Fish Add [Z91 89] At risk for injury related to restraints       ED Disposition     ED Disposition Condition Date/Time Comment    Admit Stable Sat Jun 19, 2021  6:09 AM Case was discussed with boo brady and the patient's admission status was agreed to be Admission Status: obs status to the service of Dr Rajinder Dillon           Follow-up Information     Follow up With Specialties Details Why Contact Info    Ute Hernandez PA-C Physician Assistant Schedule an appointment as soon as possible for a visit in 1 week(s)  [facility] [address] [place] [address]      Kayce Ceballos MD Internal Medicine   [address]  [address] [address]            Discharge Medication List as of 6/21/2021 11:46 AM      CONTINUE these medications which have NOT CHANGED    Details   amLODIPine (NORVASC) 10 mg tablet Take 10 mg by mouth daily, Historical Med      aspirin (ECOTRIN LOW STRENGTH) 81 mg EC tablet Take 81 mg by mouth daily, Historical Med      Cholecalciferol (VITAMIN D3) 2000 units TABS Take 2,000 Units by mouth daily, Historical Med      donepezil (ARICEPT) 5 mg tablet Take 5 mg by mouth daily at bedtime, Historical Med      hydrochlorothiazide (HYDRODIURIL) 25 mg tablet Take 25 mg by mouth daily, Historical Med      lisinopril (ZESTRIL) 20 mg tablet Take 20 mg by mouth daily, Historical Med      montelukast (SINGULAIR) 10 mg tablet Take 1 tablet (10 mg total) by mouth daily at bedtime, Starting Tue 9/15/2020, Normal      risperiDONE (RisperDAL) 0 5 mg tablet Take 0 5 mg by mouth 2 (two) times a day, Historical Med      lidocaine (LIDODERM) 5 % Apply 1 patch topically daily Remove & Discard patch within 12 hours or as directed by MD, Starting Thu 2/21/2019, Print           Outpatient Discharge Orders   Discharge Diet     Activity as tolerated       PDMP Review     None          ED Provider  Electronically Signed by           Jhon Hernandez DO  07/07/21 1523

## 2021-06-19 NOTE — ED NOTES
Pt's daughter, Ashley Dougherty, called for an update  RN to call back at earliest convenience        Mattie Hubbard  06/19/21 3064

## 2021-06-19 NOTE — ASSESSMENT & PLAN NOTE
Present admission history of Alzheimer's dementia  Resume home dose of Aricept, risperidone, Seroquel  Maintain delirium precaution

## 2021-06-20 ENCOUNTER — APPOINTMENT (OUTPATIENT)
Dept: NON INVASIVE DIAGNOSTICS | Facility: HOSPITAL | Age: 86
End: 2021-06-20
Payer: COMMERCIAL

## 2021-06-20 LAB
ANION GAP SERPL CALCULATED.3IONS-SCNC: 9 MMOL/L (ref 4–13)
BASOPHILS # BLD AUTO: 0.05 THOUSANDS/ΜL (ref 0–0.1)
BASOPHILS NFR BLD AUTO: 1 % (ref 0–1)
BUN SERPL-MCNC: 13 MG/DL (ref 5–25)
CALCIUM SERPL-MCNC: 8.9 MG/DL (ref 8.3–10.1)
CHLORIDE SERPL-SCNC: 104 MMOL/L (ref 100–108)
CO2 SERPL-SCNC: 27 MMOL/L (ref 21–32)
CREAT SERPL-MCNC: 1.26 MG/DL (ref 0.6–1.3)
EOSINOPHIL # BLD AUTO: 0.3 THOUSAND/ΜL (ref 0–0.61)
EOSINOPHIL NFR BLD AUTO: 3 % (ref 0–6)
ERYTHROCYTE [DISTWIDTH] IN BLOOD BY AUTOMATED COUNT: 15.7 % (ref 11.6–15.1)
GFR SERPL CREATININE-BSD FRML MDRD: 52 ML/MIN/1.73SQ M
GLUCOSE P FAST SERPL-MCNC: 100 MG/DL (ref 65–99)
GLUCOSE SERPL-MCNC: 100 MG/DL (ref 65–140)
HCT VFR BLD AUTO: 40.6 % (ref 36.5–49.3)
HGB BLD-MCNC: 12.9 G/DL (ref 12–17)
IMM GRANULOCYTES # BLD AUTO: 0.05 THOUSAND/UL (ref 0–0.2)
IMM GRANULOCYTES NFR BLD AUTO: 1 % (ref 0–2)
LYMPHOCYTES # BLD AUTO: 2.11 THOUSANDS/ΜL (ref 0.6–4.47)
LYMPHOCYTES NFR BLD AUTO: 20 % (ref 14–44)
MCH RBC QN AUTO: 25.5 PG (ref 26.8–34.3)
MCHC RBC AUTO-ENTMCNC: 31.8 G/DL (ref 31.4–37.4)
MCV RBC AUTO: 80 FL (ref 82–98)
MONOCYTES # BLD AUTO: 0.92 THOUSAND/ΜL (ref 0.17–1.22)
MONOCYTES NFR BLD AUTO: 9 % (ref 4–12)
NEUTROPHILS # BLD AUTO: 7.39 THOUSANDS/ΜL (ref 1.85–7.62)
NEUTS SEG NFR BLD AUTO: 66 % (ref 43–75)
NRBC BLD AUTO-RTO: 0 /100 WBCS
PLATELET # BLD AUTO: 288 THOUSANDS/UL (ref 149–390)
PMV BLD AUTO: 9.6 FL (ref 8.9–12.7)
POTASSIUM SERPL-SCNC: 3.5 MMOL/L (ref 3.5–5.3)
PROCALCITONIN SERPL-MCNC: <0.05 NG/ML
RBC # BLD AUTO: 5.06 MILLION/UL (ref 3.88–5.62)
SODIUM SERPL-SCNC: 140 MMOL/L (ref 136–145)
WBC # BLD AUTO: 10.82 THOUSAND/UL (ref 4.31–10.16)

## 2021-06-20 PROCEDURE — 85025 COMPLETE CBC W/AUTO DIFF WBC: CPT | Performed by: STUDENT IN AN ORGANIZED HEALTH CARE EDUCATION/TRAINING PROGRAM

## 2021-06-20 PROCEDURE — 93304 ECHO TRANSTHORACIC: CPT

## 2021-06-20 PROCEDURE — 93308 TTE F-UP OR LMTD: CPT | Performed by: INTERNAL MEDICINE

## 2021-06-20 PROCEDURE — 99225 PR SBSQ OBSERVATION CARE/DAY 25 MINUTES: CPT | Performed by: STUDENT IN AN ORGANIZED HEALTH CARE EDUCATION/TRAINING PROGRAM

## 2021-06-20 PROCEDURE — 84145 PROCALCITONIN (PCT): CPT | Performed by: STUDENT IN AN ORGANIZED HEALTH CARE EDUCATION/TRAINING PROGRAM

## 2021-06-20 PROCEDURE — 93321 DOPPLER ECHO F-UP/LMTD STD: CPT | Performed by: INTERNAL MEDICINE

## 2021-06-20 PROCEDURE — 93325 DOPPLER ECHO COLOR FLOW MAPG: CPT | Performed by: INTERNAL MEDICINE

## 2021-06-20 PROCEDURE — 87177 OVA AND PARASITES SMEARS: CPT | Performed by: NURSE PRACTITIONER

## 2021-06-20 PROCEDURE — 87209 SMEAR COMPLEX STAIN: CPT | Performed by: NURSE PRACTITIONER

## 2021-06-20 PROCEDURE — 87505 NFCT AGENT DETECTION GI: CPT | Performed by: NURSE PRACTITIONER

## 2021-06-20 PROCEDURE — 80048 BASIC METABOLIC PNL TOTAL CA: CPT | Performed by: STUDENT IN AN ORGANIZED HEALTH CARE EDUCATION/TRAINING PROGRAM

## 2021-06-20 PROCEDURE — 87493 C DIFF AMPLIFIED PROBE: CPT | Performed by: NURSE PRACTITIONER

## 2021-06-20 RX ORDER — POTASSIUM CHLORIDE 20 MEQ/1
40 TABLET, EXTENDED RELEASE ORAL ONCE
Status: COMPLETED | OUTPATIENT
Start: 2021-06-20 | End: 2021-06-20

## 2021-06-20 RX ORDER — OLANZAPINE 10 MG/1
10 INJECTION, POWDER, LYOPHILIZED, FOR SOLUTION INTRAMUSCULAR ONCE
Status: DISCONTINUED | OUTPATIENT
Start: 2021-06-20 | End: 2021-06-21 | Stop reason: HOSPADM

## 2021-06-20 RX ADMIN — LISINOPRIL 20 MG: 20 TABLET ORAL at 08:45

## 2021-06-20 RX ADMIN — SODIUM CHLORIDE 100 ML/HR: 0.9 INJECTION, SOLUTION INTRAVENOUS at 01:03

## 2021-06-20 RX ADMIN — HEPARIN SODIUM 5000 UNITS: 5000 INJECTION INTRAVENOUS; SUBCUTANEOUS at 05:48

## 2021-06-20 RX ADMIN — MONTELUKAST SODIUM 10 MG: 10 TABLET, FILM COATED ORAL at 22:29

## 2021-06-20 RX ADMIN — HYDROCHLOROTHIAZIDE 25 MG: 25 TABLET ORAL at 08:45

## 2021-06-20 RX ADMIN — RISPERIDONE 0.5 MG: 0.25 TABLET ORAL at 08:45

## 2021-06-20 RX ADMIN — ASPIRIN 81 MG: 81 TABLET, COATED ORAL at 09:11

## 2021-06-20 RX ADMIN — AMLODIPINE BESYLATE 10 MG: 10 TABLET ORAL at 08:45

## 2021-06-20 RX ADMIN — HEPARIN SODIUM 5000 UNITS: 5000 INJECTION INTRAVENOUS; SUBCUTANEOUS at 22:29

## 2021-06-20 RX ADMIN — RISPERIDONE 0.5 MG: 0.25 TABLET ORAL at 18:12

## 2021-06-20 RX ADMIN — HEPARIN SODIUM 5000 UNITS: 5000 INJECTION INTRAVENOUS; SUBCUTANEOUS at 13:55

## 2021-06-20 RX ADMIN — DONEPEZIL HYDROCHLORIDE 5 MG: 5 TABLET, FILM COATED ORAL at 22:29

## 2021-06-20 RX ADMIN — POTASSIUM CHLORIDE 40 MEQ: 1500 TABLET, EXTENDED RELEASE ORAL at 08:44

## 2021-06-20 NOTE — PROGRESS NOTES
* SIRS (systemic inflammatory response syndrome) [facility])  Assessment & Plan  82year old male with past medical history of Alzheimer's dementia, hypertension, COVID-19 infection, hyperlipidemia, was found by   Kelsey Carnes stated that patient had wonder off form his house  On further evaluation patient noted with leukocytosis, lactic acidosis, tachycardia, tachypnea without clear source of infection  Currently heart rate better, blood pressure stable  No clear source of infection noted  Lactic acidosis now resolved after hydration  Troponin slightly elevated 0 8 x 2    Currently patient is stable, denies chest pain, dyspnea  (nursing staff helped with interpretation)  Currently patient is hemodynamically stable  Discontinue IV heparin drip  Continue supportive care  Continue IV hydration  Maintained delirium precaution    - On lap restraints   - Continues to be disoriented and at times combative   - Consider 1 to 1    - Will call family to see if they can visit him   Continue to monitor off antibiotics  Follow-up with pending blood cultures  - Patient also had 2x large watery bowel movements    - Concern for C  Diff colitis, pending C  Diff tests, ova and parasite and enteric bacterial  Cultures   - Leukocytosis improved from 12 68 --> 10 82; continue to monitor  Family is agreeable with above plan  Consider starting on broad-spectrum antibiotics if develops fever and worsening signs of infection  Alzheimer's dementia [facility])  Assessment & Plan  Present admission history of Alzheimer's dementia  Resume home dose of Aricept, risperidone, Seroquel  Maintain delirium precaution    - Consider olanzapine 10 mg PRN for acute episodes    HTN (hypertension)  Assessment & Plan  Present on admission history of hypertension  Currently controlled  Resume home dose of Norvasc, hydrochlorothiazide, lisinopril with holding parameters  Elevated troponin  Assessment & Plan  Noted with troponin 0 08 x 2    Chest x-ray report noted negative for acute hemorrhage  Currently patient is IV heparin drip but elevated troponin less likely to be cardiac in origin  Will discontinue heparin drip  Follow-up with 2D echo  Follow-up with cardiology recommendation  History of COVID-19  Assessment & Plan  Patient has history of COVID-19 infection approximately 2 months ago  CT PE study report noted negative for pulmonary embolism, noted with atelectasis  Continue supportive care  VTE Pharmacologic Prophylaxis:   VTE Score: 4 Moderate Risk (Score 3-4) - Pharmacological DVT Prophylaxis Ordered: Heparin  Mechanical VTE Prophylaxis in Place: No    Patient Centered Rounds: I have performed bedside rounds with nursing staff today  Discussions with Specialists or Other Care Team Provider: Cardiology    Education and Discussions with Family / Patient: Will call patient's grandson and update the plan    Current Length of Stay: 0 day(s)    Current Patient Status: Observation     Discharge Plan / Estimated Discharge Date: Anticipate discharge tomorrow to home  Code Status: Level 1 - Full Code      Subjective:   Patient was seen and examined at the bedside this morning  There were no acute events overnight  In the early morning, the patient had a bout of delirium that was resolved with reorientation  This morning, with the help of interpreting services, the patient does not report any complaints  However, he remains disoriented and unaware of his condition  He reports eating well and no trouble with urination or bowel movement  He denies abdominal pain, nausea, vomiting, chest pain, dyspnea, limb pain, dizziness, light headedness or changes in sensation over his limbs        Objective:     Vitals:   Temp (24hrs), Av 1 °F (36 7 °C), Min:97 8 °F (36 6 °C), Max:98 3 °F (36 8 °C)    Temp:  [97 8 °F (36 6 °C)-98 3 °F (36 8 °C)] 98 3 °F (36 8 °C)  HR:  [58-85] 85  Resp:  [16-18] 18  BP: (143-162)/() 156/81  SpO2:  [93 %-98 %] 93 %  There is no height or weight on file to calculate BMI  Input and Output Summary (last 24 hours): Intake/Output Summary (Last 24 hours) at 6/20/2021 1128  Last data filed at 6/20/2021 0500  Gross per 24 hour   Intake 300 ml   Output 700 ml   Net -400 ml       Physical Exam:     Physical Exam  Constitutional:       General: He is not in acute distress  Appearance: He is not ill-appearing, toxic-appearing or diaphoretic  HENT:      Head: Normocephalic and atraumatic  Mouth/Throat:      Mouth: Mucous membranes are moist       Pharynx: Oropharynx is clear  Eyes:      Extraocular Movements: Extraocular movements intact  Cardiovascular:      Rate and Rhythm: Normal rate and regular rhythm  Pulses: Normal pulses  Heart sounds: Normal heart sounds  No murmur heard  No friction rub  No gallop  Pulmonary:      Effort: Pulmonary effort is normal       Breath sounds: Normal breath sounds  No wheezing, rhonchi or rales  Abdominal:      General: Abdomen is flat  Bowel sounds are normal  There is no distension  Palpations: Abdomen is soft  Tenderness: There is no abdominal tenderness  There is no guarding  Musculoskeletal:         General: No swelling or signs of injury  Skin:     General: Skin is warm and dry  Neurological:      Mental Status: He is alert  He is disoriented  Motor: No weakness        Gait: Gait normal        Additional Data:     Labs:  Results from last 7 days   Lab Units 06/20/21  0544   WBC Thousand/uL 10 82*   HEMOGLOBIN g/dL 12 9   HEMATOCRIT % 40 6   PLATELETS Thousands/uL 288   NEUTROS PCT % 66   LYMPHS PCT % 20   MONOS PCT % 9   EOS PCT % 3     Results from last 7 days   Lab Units 06/20/21  0544 06/19/21  0126   SODIUM mmol/L 140 141   POTASSIUM mmol/L 3 5 4 0   CHLORIDE mmol/L 104 104   CO2 mmol/L 27 29   BUN mg/dL 13 13   CREATININE mg/dL 1 26 1 41*   ANION GAP mmol/L 9 8   CALCIUM mg/dL 8 9 9 2   ALBUMIN g/dL  --  3 5   TOTAL BILIRUBIN mg/dL  --  0 26   ALK PHOS U/L  --  81   ALT U/L  --  26   AST U/L  --  19   GLUCOSE RANDOM mg/dL 100 117     Results from last 7 days   Lab Units 06/19/21  0609   INR  1 09             Results from last 7 days   Lab Units 06/19/21  1300 06/19/21  0416 06/19/21  0126   LACTIC ACID mmol/L 1 4 2 1* 3 1*       Imaging: Reviewed radiology reports from this admission including: abdominal/pelvic CT scan, chest CT scan, CT head and CT spine cervical without contrast    Recent Cultures (last 7 days):     Results from last 7 days   Lab Units 06/19/21  0125   BLOOD CULTURE  Received in Microbiology Lab  Culture in Progress  Received in Microbiology Lab  Culture in Progress  Lines/Drains:  Invasive Devices     Peripheral Intravenous Line            Peripheral IV 06/19/21 Left Hand 1 day    Peripheral IV 06/19/21 Right Antecubital 1 day                Telemetry:        Last 24 Hours Medication List:   Current Facility-Administered Medications   Medication Dose Route Frequency Provider Last Rate    amLODIPine  10 mg Oral Daily Denis Cerna MD      aspirin  81 mg Oral Daily Denis CORTES MD      donepezil  5 mg Oral HS Denis CORTES MD      heparin (porcine)  5,000 Units Subcutaneous Q8H [address] Denis CORTES MD      hydrochlorothiazide  25 mg Oral Daily Denis CORTES MD      lisinopril  20 mg Oral Daily Denis CORTES MD      montelukast  10 mg Oral HS Denis CORTES MD      OLANZapine  10 mg Intramuscular Once TRACY Young      risperiDONE  0 5 mg Oral BID Desiree CORTES MD      sodium chloride  100 mL/hr Intravenous Continuous Desiree CORTES  mL/hr (06/20/21 0103)        Today, Patient Was Seen By: Master Sanon    ** Please Note: This note has been constructed using a voice recognition system   **

## 2021-06-20 NOTE — PHYSICIAN ADVISOR
Current patient class: Observation  The patient is currently on Hospital Day: 2 at [address]        The patient was admitted to the hospital  on N/A at N/A for the following diagnosis:  Diaphoresis [R61]  NSTEMI (non-ST elevated myocardial infarction) ([facility] [address] ) [I21 4]  AMS (altered mental status) [R41 82]     After review of the relevant documentation, labs, vital signs and test results, the patient is most appropriate for OBSERVATION STATUS  The patient was admitted to the hospital without an expected length of stay of at least 2 midnights  Given that the patient is subject to the 2 midnight benchmark as a CMS patient, they are appropriate for observation status at this time  Should the patient remain hospitalized for a second midnight the status should be reevaluated for medical necessity  Rationale is as follows: The patient is a 82 yrs   Male who presented to the ED at 6/19/2021 12:28 AM with a chief complaint of Medical Problem (Pt arrived to the ED with PSP  Officer states that pt was found in front of police station, sitting in the grass and that he was diaphoretic and couldn't stand up  Pt walked into the ED with no assistance  )   Pt is known to have dementia  On initial evaluation he had lactate acid elevations and elevated wbc  Etiology is not known  Ct of head,neck abd chest neg for any acute findings or obvious cause for potential infection source  Pt received IV hydration with improvement in his demeanor and was thought to be closer to baseline  Cardiology consult obtained  Stool studies sent for evaluation and are pending  Iv hydration was stopped this afternoon  Blood cultures neg to date  Given that pt status is improved and no new interventions are suggested, would advise keep oBV with anticipated d/c home tomorrow   If blood cultures or stool cultures are positive and/or pt is not discharged tomorrow, and requires additional care/treatment, then would change status to IP tomorrow  Will keep pt in our work que to have situation re evaluated to make appropriate determination  The patients vitals on arrival were   ED Triage Vitals [06/19/21 0029]   Temperature Pulse Respirations Blood Pressure SpO2   99 8 °F (37 7 °C) (!) 109 18 132/86 97 %      Temp Source Heart Rate Source Patient Position - Orthostatic VS BP Location FiO2 (%)   Oral Monitor Lying Right arm --      Pain Score       No Pain           Past Medical History:   Diagnosis Date    Dementia ([facility] )     Diabetes mellitus ([facility] )     Hypertension     Prostate CA ([place] [address] )      History reviewed  No pertinent surgical history          Consults have been placed to:   IP CONSULT TO CARDIOLOGY  IP CONSULT TO NUTRITION SERVICES    Vitals:    06/20/21 0305 06/20/21 0306 06/20/21 0712 06/20/21 1455   BP: (!) 161/104 (!) 162/103 156/81 138/83   BP Location: Right arm   Right arm   Pulse: 80 77 85 86   Resp: 18  18 18   Temp: 98 2 °F (36 8 °C) 98 2 °F (36 8 °C) 98 3 °F (36 8 °C) 98 4 °F (36 9 °C)   TempSrc: Oral   Oral   SpO2: 96% 94% 93% 95%       Most recent labs:    Recent Labs     06/19/21  0126 06/19/21  0609 06/19/21  1539 06/20/21  0544   WBC 12 68*  --   --  10 82*   HGB 12 4  --   --  12 9   HCT 39 4  --   --  40 6     --   --  288   K 4 0  --   --  3 5   CALCIUM 9 2  --   --  8 9   BUN 13  --   --  13   CREATININE 1 41*  --   --  1 26   INR  --  1 09  --   --    TROPONINI <0 02  --  0 04  --    AST 19  --   --   --    ALT 26  --   --   --    ALKPHOS 81  --   --   --        Scheduled Meds:  Current Facility-Administered Medications   Medication Dose Route Frequency Provider Last Rate    amLODIPine  10 mg Oral Daily Denis Taylor MD      aspirin  81 mg Oral Daily Denis CORTES MD      donepezil  5 mg Oral HS Denis CORTES MD      heparin (porcine)  5,000 Units Subcutaneous Q8H [facility] Denis CORTES MD      hydrochlorothiazide  25 mg Oral Daily Denis CORTES MD      lisinopril  20 mg Oral Daily Denis CORTES MD      montelukast  10 mg Oral HS Denis CORTES MD      OLANZapine  10 mg Intramuscular Once TRACY Murray      risperiDONE  0 5 mg Oral BID Denis CORTES MD       Continuous Infusions:   PRN Meds:      Surgical procedures (if appropriate):

## 2021-06-20 NOTE — ASSESSMENT & PLAN NOTE
Present admission history of Alzheimer's dementia  Resume home dose of Aricept, risperidone, Seroquel    Maintain delirium precaution    - Consider olanzapine 10 mg PRN for acute episodes

## 2021-06-20 NOTE — UTILIZATION REVIEW
Initial Clinical Review    Admission: Date/Time/Statement:   Admission Orders (From admission, onward)     Ordered        06/19/21 0611  Place in Observation  Once                   Orders Placed This Encounter   Procedures    Place in Observation     Standing Status:   Standing     Number of Occurrences:   1     Order Specific Question:   Level of Care     Answer:   Med Surg [16]     Order Specific Question:   Bed request comments     Answer:   tele     ED Arrival Information     Expected Arrival Acuity    - 6/19/2021 00:26 Urgent         Means of arrival Escorted by Service Admission type    [address] [place]  Urgent         Arrival complaint            Chief Complaint   Patient presents with    Medical Problem     Pt arrived to the ED with PSP  Officer states that pt was found in front of police station, sitting in the grass and that he was diaphoretic and couldn't stand up  Pt walked into the ED with no assistance  Initial Presentation:  82 y o  male with a PMH of history of Alzheimer's dementia, hypertension, recent COVID-19 infection, who was brought to the ED by police  t was found in front of the police station sitting in the grass,  he was diaphoretic and could not stand up  Patient is a poor historian  History obtained from family  According to patient's grandson, patient has history of Alzheimer's dementia and he must have wandered from home  In the ED he was tachycardic with elevated lactic acidosis,  mild leukocytosis and flat ,elevated troponin 0 08 x 2, creatinine 1 41  Physical exam:  Awake, alert, disoriented, poor historian  Plan: Admit to Observation for evaluation and treatment of SIRS and elevated troponin:  IV hydration, ECHO, consult Cardiology  6/19 Cardiology consult:  Mildly elevated flat troponin  Peak 0 08   clinically patient is not in ACS  Continue telemetry monitoring  Limited 2D echo to evaluate LVEF   Does not require heparin unless he gets chest pain or significant elevated troponin  Continue current antihypertensive  6/20 Internal Medicine: Patient had large bowel movement/ diarrhea this morning  Leukocytosis improving  Clinically patient is awake, alert, oriented at his baseline  Hemodynamically stable, acutely nontoxic appearing  Currently patient is redirectable  Discontinue lap restraint  Plan is to continue gentle IV hydration, continue monitor off antibiotics for at least 24  Follow-up with C diff  Continue supportive care          ED Triage Vitals [06/19/21 0029]   Temperature Pulse Respirations Blood Pressure SpO2   99 8 °F (37 7 °C) (!) 109 18 132/86 97 %      Temp Source Heart Rate Source Patient Position - Orthostatic VS BP Location FiO2 (%)   Oral Monitor Lying Right arm --      Pain Score       No Pain          Wt Readings from Last 1 Encounters:   09/15/20 65 8 kg (145 lb)     Additional Vital Signs:         Date/Time  Temp  Pulse  Resp  BP  MAP (mmHg)  SpO2  O2 Device    06/20/21 14:55:30  98 4 °F (36 9 °C)  86  18  138/83  101  95 %  None (Room air)      Date/Time  Temp  Pulse  Resp  BP  MAP (mmHg)  SpO2  O2 Device  Patient Position - Orthostatic VS   06/20/21 07:12:32  98 3 °F (36 8 °C)  85  18  156/81  106  93 %  --  --   06/20/21 03:06:28  98 2 °F (36 8 °C)  77  --  162/103Abnormal   123  94 %  --  --   06/20/21 03:05:43  98 2 °F (36 8 °C)  80  18  161/104Abnormal   123  96 %  --  Lying   06/19/21 22:29:58  97 9 °F (36 6 °C)  69  16  143/85  104  97 %  --  --   06/19/21 19:20:20  97 8 °F (36 6 °C)  75  --  146/81  103  95 %  None (Room air)  Lying   06/19/21 17:51:59  98 °F (36 7 °C)  68  --  148/81  103  96 %  --  --   06/19/21 1542  --  58  16  146/80  --  98 %  None (Room air)  Lying   06/19/21 0724  97 6 °F (36 4 °C)  79  16  132/72  --  95 %  None (Room air)  Lying   06/19/21 0600  --  80  13  138/77  102  --  --  --   06/19/21 0545  --  83  19  118/79  --  --  --  --   06/19/21 0515  --  81  16  120/59  80  94 %  -- --   06/19/21 0501  --  84  23Abnormal   120/59  80  94 %  None (Room air)  Lying   06/19/21 0500  --  87  27Abnormal   152/113Abnormal   127  94 %  --  --   06/19/21 0400  --  99  39Abnormal   159/89  115  --  --  --   06/19/21 0330  --  94  21  --  --  96 %  --  --   06/19/21 0315  --  --  --  149/71  103  --  --  --   06/19/21 0215  --  91  32Abnormal   130/71  --  96 %  --  --           Pertinent Labs/Diagnostic Test Results:       6/20 ECHO:    LEFT VENTRICLE: Size was normal  Systolic function was normal  Ejection fraction was estimated to be 55 %  Although no diagnostic regional wall motion abnormality was identified, this possibility cannot be completely excluded on the basis  of this study  There was mild assymetrical hypertrophy of the septum  DOPPLER: Doppler parameters were consistent with abnormal left ventricular relaxation (grade 1 diastolic dysfunction)      MITRAL VALVE: There was mild to moderate annular calcification  DOPPLER: There was no evidence for stenosis  There was mild to moderate regurgitation      AORTIC VALVE: DOPPLER: There was no evidence for stenosis  There was trace regurgitation      PERICARDIUM: There was no pericardial effusion  A pericardial fat pad was present      AORTA: The root exhibited normal size  6/19 EKG:  Normal sinus rhythm  Voltage criteria for left ventricular hypertrophy    6/19 CT CAP:     1  Hypoventilatory changes of the lungs scattered areas of atelectasis  2   6 mm nodule in the right middle lobe  12 month follow-up non-contrast chest CT is recommended or as per oncology follow-up  3   Coronary artery calcifications  4   Scattered colonic diverticula without evidence of diverticulitis  5   Marked enlargement of the prostate  6   Large right and small left fat-containing right inguinal hernias        6/19 CT head:   1   No acute intracranial hemorrhage, midline shift, or mass effect  2  Advanced chronic small vessel ischemic changes      6/19 CT c-spine:  No cervical spine fracture or traumatic malalignment              Results from last 7 days   Lab Units 06/20/21  0544 06/19/21  0126   WBC Thousand/uL 10 82* 12 68*   HEMOGLOBIN g/dL 12 9 12 4   HEMATOCRIT % 40 6 39 4   PLATELETS Thousands/uL 288 285   NEUTROS ABS Thousands/µL 7 39 10 25*         Results from last 7 days   Lab Units 06/20/21  0544 06/19/21  0126   SODIUM mmol/L 140 141   POTASSIUM mmol/L 3 5 4 0   CHLORIDE mmol/L 104 104   CO2 mmol/L 27 29   ANION GAP mmol/L 9 8   BUN mg/dL 13 13   CREATININE mg/dL 1 26 1 41*   EGFR ml/min/1 73sq m 52 45   CALCIUM mg/dL 8 9 9 2     Results from last 7 days   Lab Units 06/19/21  0126   AST U/L 19   ALT U/L 26   ALK PHOS U/L 81   TOTAL PROTEIN g/dL 7 4   ALBUMIN g/dL 3 5   TOTAL BILIRUBIN mg/dL 0 26         Results from last 7 days   Lab Units 06/20/21  0544 06/19/21  0126   GLUCOSE RANDOM mg/dL 100 117               Results from last 7 days   Lab Units 06/19/21  1539 06/19/21  0801 06/19/21  0416 06/19/21  0126   TROPONIN I ng/mL 0 04 0 08* 0 08* <0 02         Results from last 7 days   Lab Units 06/19/21  1259 06/19/21  0609   PROTIME seconds  --  14 3   INR   --  1 09   PTT seconds 87* 34         Results from last 7 days   Lab Units 06/20/21  0544   PROCALCITONIN ng/ml <0 05     Results from last 7 days   Lab Units 06/19/21  1300 06/19/21  0416 06/19/21  0126   LACTIC ACID mmol/L 1 4 2 1* 3 1*                 Results from last 7 days   Lab Units 06/19/21  0140   CLARITY UA  Clear   COLOR UA  Yellow   SPEC GRAV UA  1 020   PH UA  5 5   GLUCOSE UA mg/dl Negative   KETONES UA mg/dl Negative   BLOOD UA  Trace-Intact*   PROTEIN UA mg/dl Trace*   NITRITE UA  Negative   BILIRUBIN UA  Negative   UROBILINOGEN UA E U /dl 0 2   LEUKOCYTES UA  Negative   WBC UA /hpf 0-1   RBC UA /hpf 1-2   BACTERIA UA /hpf Occasional   EPITHELIAL CELLS WET PREP /hpf Occasional                                 Results from last 7 days   Lab Units 06/19/21  0125   BLOOD CULTURE  No Growth at 24 hrs  No Growth at 24 hrs                 ED Treatment:   Medication Administration from 06/19/2021 0026 to 06/19/2021 1741       Date/Time Order Dose Route Action Action by Comments     06/19/2021 0305 OLANZapine (ZyPREXA) IM injection 10 mg 10 mg Intramuscular Given Fina Oleary RN      06/19/2021 0412 sodium chloride 0 9 % bolus 1,000 mL 0 mL Intravenous Stopped Zulay Velazquez RN      06/19/2021 0306 sodium chloride 0 9 % bolus 1,000 mL 1,000 mL Intravenous [address] Fina Oleary RN      06/19/2021 0305 sterile water injection **ADS Override Pull** 2 1 mL  Given Zulay Velazquez RN      06/19/2021 0335 iohexol (OMNIPAQUE) 350 MG/ML injection (SINGLE-DOSE) 100 mL 100 mL Intravenous Given Shahnaz Farnsworth      06/19/2021 0447 hydrOXYzine HCL (ATARAX) tablet 25 mg 25 mg Oral Given Fina Oleary RN      06/19/2021 0447 haloperidol lactate (HALDOL) injection 5 mg 5 mg Intramuscular Given Fina Oleary RN      06/19/2021 3059 heparin (porcine) injection 3,900 Units 3,900 Units Intravenous Given Zulay Velazquez RN      06/19/2021 1436 heparin (porcine) 25,000 units in 0 45% NaCl 250 mL infusion (premix) 0 Units/kg/hr Intravenous Stopped Stephan Fontanez RN      06/19/2021 0617 heparin (porcine) 25,000 units in 0 45% NaCl 250 mL infusion (premix) 12 Units/kg/hr Intravenous 2026 Leonardo Velazquez RN      06/19/2021 1607 amLODIPine (NORVASC) tablet 10 mg 10 mg Oral Given Stephan Fontanez RN      06/19/2021 1607 aspirin (ECOTRIN LOW STRENGTH) EC tablet 81 mg 81 mg Oral Given Stephan Fontanez RN      06/19/2021 1608 hydrochlorothiazide (HYDRODIURIL) tablet 25 mg 25 mg Oral Given Stephan Fontanez RN      06/19/2021 1607 lisinopril (ZESTRIL) tablet 20 mg 20 mg Oral Given Stephan Fontanez RN      06/19/2021 1607 heparin (porcine) subcutaneous injection 5,000 Units 5,000 Units Subcutaneous Given Stephan Fontanez RN      06/19/2021 1606 sodium chloride 0 9 % infusion 125 mL/hr Intravenous [place] [facility] [place] Cherry Madrigal RN         Past Medical History:   Diagnosis Date    Dementia ([facility] )     Diabetes mellitus ([facility] )     Hypertension     Prostate CA ([facility] )      Present on Admission:  **None**      Admitting Diagnosis: Diaphoresis [R61]  NSTEMI (non-ST elevated myocardial infarction) ([facility] ) [I21 4]  AMS (altered mental status) [R41 82]  Age/Sex: 82 y o  male       Admission Orders:      Telemetry, ECHO  Scheduled Medications:  amLODIPine, 10 mg, Oral, Daily  aspirin, 81 mg, Oral, Daily  donepezil, 5 mg, Oral, HS  heparin (porcine), 5,000 Units, Subcutaneous, Q8H [facility]  hydrochlorothiazide, 25 mg, Oral, Daily  lisinopril, 20 mg, Oral, Daily  montelukast, 10 mg, Oral, HS  OLANZapine, 10 mg, Intramuscular, Once  risperiDONE, 0 5 mg, Oral, BID      Continuous IV Infusions:       PRN Meds:       IP CONSULT TO CARDIOLOGY  IP CONSULT TO NUTRITION SERVICES    Network Utilization Review Department  ATTENTION: Please call with any questions or concerns to [phone] and carefully listen to the prompts so that you are directed to the right person  All voicemails are confidential   Melonie List all requests for admission clinical reviews, approved or denied determinations and any other requests to dedicated fax number below belonging to the campus where the patient is receiving treatment   List of dedicated fax numbers for the Facilities:  [address] DENIALS (Administrative/Medical Necessity) [phone]   [address] [place] [address] (Maternity/NICU/Pediatrics) [phone]   [address] [phone]   [address] [place] [address] [address] [facility] [address] [place] [address] [facility] [phone] [place] [address] [phone]

## 2021-06-20 NOTE — ASSESSMENT & PLAN NOTE
82year old male with past medical history of Alzheimer's dementia, hypertension, COVID-19 infection, hyperlipidemia, was found by   Georges Colmenares stated that patient had wonder off form his house  On further evaluation patient noted with leukocytosis, lactic acidosis, tachycardia, tachypnea without clear source of infection  Currently heart rate better, blood pressure stable  No clear source of infection noted  Lactic acidosis now resolved after hydration  Troponin slightly elevated 0 8 x 2  Initiated heparin drip and subsequently discontinued by cardiology  No angina symptoms  Currently patient is stable, denies chest pain, dyspnea  (nursing staff helped with interpretation)  Currently patient is hemodynamically stable  Continue supportive care  Maintained delirium precaution  Continue to monitor off antibiotics  Blood culture negative x2  C diff negative  Stool bacteria panel negative  Encourage oral hydration  Family is agreeable above plans

## 2021-06-20 NOTE — PLAN OF CARE
Problem: METABOLIC, FLUID AND ELECTROLYTES - ADULT  Goal: Electrolytes maintained within normal limits  Description: INTERVENTIONS:  - Monitor labs and assess patient for signs and symptoms of electrolyte imbalances  - Administer electrolyte replacement as ordered  - Monitor response to electrolyte replacements, including repeat lab results as appropriate  - Instruct patient on fluid and nutrition as appropriate  6/20/2021 0800 by Sita Laughlin RN  Outcome: Progressing  6/19/2021 1854 by Sita Laughlin RN  Outcome: Progressing  Goal: Fluid balance maintained  Description: INTERVENTIONS:  - Monitor labs   - Monitor I/O and WT  - Instruct patient on fluid and nutrition as appropriate  - Assess for signs & symptoms of volume excess or deficit  6/20/2021 0800 by Sita Laughlin RN  Outcome: Progressing  6/19/2021 1854 by Sita Laughlin RN  Outcome: Progressing

## 2021-06-21 VITALS
TEMPERATURE: 97.5 F | SYSTOLIC BLOOD PRESSURE: 135 MMHG | DIASTOLIC BLOOD PRESSURE: 85 MMHG | OXYGEN SATURATION: 94 % | RESPIRATION RATE: 20 BRPM | HEART RATE: 85 BPM

## 2021-06-21 LAB
ANION GAP SERPL CALCULATED.3IONS-SCNC: 10 MMOL/L (ref 4–13)
BASOPHILS # BLD AUTO: 0.04 THOUSANDS/ΜL (ref 0–0.1)
BASOPHILS NFR BLD AUTO: 0 % (ref 0–1)
BUN SERPL-MCNC: 21 MG/DL (ref 5–25)
C DIFF TOX B TCDB STL QL NAA+PROBE: NEGATIVE
CALCIUM SERPL-MCNC: 9.3 MG/DL (ref 8.3–10.1)
CAMPYLOBACTER DNA SPEC NAA+PROBE: NORMAL
CHLORIDE SERPL-SCNC: 104 MMOL/L (ref 100–108)
CO2 SERPL-SCNC: 26 MMOL/L (ref 21–32)
CREAT SERPL-MCNC: 1.21 MG/DL (ref 0.6–1.3)
EOSINOPHIL # BLD AUTO: 0.27 THOUSAND/ΜL (ref 0–0.61)
EOSINOPHIL NFR BLD AUTO: 2 % (ref 0–6)
ERYTHROCYTE [DISTWIDTH] IN BLOOD BY AUTOMATED COUNT: 15.7 % (ref 11.6–15.1)
GFR SERPL CREATININE-BSD FRML MDRD: 55 ML/MIN/1.73SQ M
GLUCOSE SERPL-MCNC: 102 MG/DL (ref 65–140)
HCT VFR BLD AUTO: 38.5 % (ref 36.5–49.3)
HGB BLD-MCNC: 12.1 G/DL (ref 12–17)
IMM GRANULOCYTES # BLD AUTO: 0.05 THOUSAND/UL (ref 0–0.2)
IMM GRANULOCYTES NFR BLD AUTO: 0 % (ref 0–2)
LYMPHOCYTES # BLD AUTO: 2.63 THOUSANDS/ΜL (ref 0.6–4.47)
LYMPHOCYTES NFR BLD AUTO: 23 % (ref 14–44)
MCH RBC QN AUTO: 25.1 PG (ref 26.8–34.3)
MCHC RBC AUTO-ENTMCNC: 31.4 G/DL (ref 31.4–37.4)
MCV RBC AUTO: 80 FL (ref 82–98)
MONOCYTES # BLD AUTO: 1.3 THOUSAND/ΜL (ref 0.17–1.22)
MONOCYTES NFR BLD AUTO: 11 % (ref 4–12)
NEUTROPHILS # BLD AUTO: 7.31 THOUSANDS/ΜL (ref 1.85–7.62)
NEUTS SEG NFR BLD AUTO: 64 % (ref 43–75)
NRBC BLD AUTO-RTO: 0 /100 WBCS
PLATELET # BLD AUTO: 281 THOUSANDS/UL (ref 149–390)
PMV BLD AUTO: 9.7 FL (ref 8.9–12.7)
POTASSIUM SERPL-SCNC: 3.4 MMOL/L (ref 3.5–5.3)
PROCALCITONIN SERPL-MCNC: <0.05 NG/ML
RBC # BLD AUTO: 4.82 MILLION/UL (ref 3.88–5.62)
SALMONELLA DNA SPEC QL NAA+PROBE: NORMAL
SHIGA TOXIN STX GENE SPEC NAA+PROBE: NORMAL
SHIGELLA DNA SPEC QL NAA+PROBE: NORMAL
SODIUM SERPL-SCNC: 140 MMOL/L (ref 136–145)
WBC # BLD AUTO: 11.6 THOUSAND/UL (ref 4.31–10.16)

## 2021-06-21 PROCEDURE — 99217 PR OBSERVATION CARE DISCHARGE MANAGEMENT: CPT | Performed by: STUDENT IN AN ORGANIZED HEALTH CARE EDUCATION/TRAINING PROGRAM

## 2021-06-21 PROCEDURE — 85025 COMPLETE CBC W/AUTO DIFF WBC: CPT | Performed by: INTERNAL MEDICINE

## 2021-06-21 PROCEDURE — 84145 PROCALCITONIN (PCT): CPT | Performed by: STUDENT IN AN ORGANIZED HEALTH CARE EDUCATION/TRAINING PROGRAM

## 2021-06-21 PROCEDURE — 80048 BASIC METABOLIC PNL TOTAL CA: CPT | Performed by: INTERNAL MEDICINE

## 2021-06-21 RX ORDER — POTASSIUM CHLORIDE 20 MEQ/1
40 TABLET, EXTENDED RELEASE ORAL ONCE
Status: COMPLETED | OUTPATIENT
Start: 2021-06-21 | End: 2021-06-21

## 2021-06-21 RX ORDER — POTASSIUM CHLORIDE 20 MEQ/1
20 TABLET, EXTENDED RELEASE ORAL ONCE
Status: CANCELLED | OUTPATIENT
Start: 2021-06-21

## 2021-06-21 RX ADMIN — LISINOPRIL 20 MG: 20 TABLET ORAL at 09:02

## 2021-06-21 RX ADMIN — POTASSIUM CHLORIDE 40 MEQ: 1500 TABLET, EXTENDED RELEASE ORAL at 09:01

## 2021-06-21 RX ADMIN — AMLODIPINE BESYLATE 10 MG: 10 TABLET ORAL at 09:02

## 2021-06-21 RX ADMIN — ASPIRIN 81 MG: 81 TABLET, COATED ORAL at 09:01

## 2021-06-21 RX ADMIN — HEPARIN SODIUM 5000 UNITS: 5000 INJECTION INTRAVENOUS; SUBCUTANEOUS at 06:44

## 2021-06-21 RX ADMIN — RISPERIDONE 0.5 MG: 0.25 TABLET ORAL at 09:02

## 2021-06-21 RX ADMIN — HYDROCHLOROTHIAZIDE 25 MG: 25 TABLET ORAL at 09:02

## 2021-06-21 NOTE — PLAN OF CARE
Problem: Potential for Falls  Goal: Patient will remain free of falls  Description: INTERVENTIONS:  - Educate patient/family on patient safety including physical limitations  - Instruct patient to call for assistance with activity   - Consult OT/PT to assist with strengthening/mobility   - Keep Call bell within reach  - Keep bed low and locked with side rails adjusted as appropriate  - Keep care items and personal belongings within reach  - Initiate and maintain comfort rounds  - Make Fall Risk Sign visible to staff  - Offer Toileting every  Hours, in advance of need  - Initiate/Maintain alarm  - Obtain necessary fall risk management equipme  - Apply yellow socks and bracelet for high fall risk patients  - Consider moving patient to room near nurses station  6/21/2021 1508 by Kael Pickett RN  Outcome: Adequate for Discharge  6/21/2021 0948 by Kael Pickett RN  Outcome: Progressing     Problem: NEUROSENSORY - ADULT  Goal: Achieves stable or improved neurological status  Description: INTERVENTIONS  - Monitor and report changes in neurological status  - Monitor vital signs such as temperature, blood pressure, glucose, and any other labs ordered   - Initiate measures to prevent increased intracranial pressure  - Monitor for seizure activity and implement precautions if appropriate      6/21/2021 1508 by Kael Pickett RN  Outcome: Adequate for Discharge  6/21/2021 0948 by Kael Pickett RN  Outcome: Progressing  Goal: Achieves maximal functionality and self care  Description: INTERVENTIONS  - Monitor swallowing and airway patency with patient fatigue and changes in neurological status  - Encourage and assist patient to increase activity and self care     - Encourage visually impaired, hearing impaired and aphasic patients to use assistive/communication devices  6/21/2021 1508 by Kael Pickett RN  Outcome: Adequate for Discharge  6/21/2021 0948 by Kael Pickett RN  Outcome: Progressing     Problem: METABOLIC, FLUID AND ELECTROLYTES - ADULT  Goal: Electrolytes maintained within normal limits  Description: INTERVENTIONS:  - Monitor labs and assess patient for signs and symptoms of electrolyte imbalances  - Administer electrolyte replacement as ordered  - Monitor response to electrolyte replacements, including repeat lab results as appropriate  - Instruct patient on fluid and nutrition as appropriate  6/21/2021 1508 by Leyla De La Cruz RN  Outcome: Adequate for Discharge  6/21/2021 0948 by Leyla De La Cruz RN  Outcome: Progressing  Goal: Fluid balance maintained  Description: INTERVENTIONS:  - Monitor labs   - Monitor I/O and WT  - Instruct patient on fluid and nutrition as appropriate  - Assess for signs & symptoms of volume excess or deficit  6/21/2021 1508 by Leyla De La Cruz RN  Outcome: Adequate for Discharge  6/21/2021 0948 by Leyla De La Cruz RN  Outcome: Progressing     Problem: MUSCULOSKELETAL - ADULT  Goal: Maintain or return mobility to safest level of function  Description: INTERVENTIONS:  - Assess patient's ability to carry out ADLs; assess patient's baseline for ADL function and identify physical deficits which impact ability to perform ADLs (bathing, care of mouth/teeth, toileting, grooming, dressing, etc )  - Assess/evaluate cause of self-care deficits   - Assess range of motion  - Assess patient's mobility  - Assess patient's need for assistive devices and provide as appropriate  - Encourage maximum independence but intervene and supervise when necessary  - Involve family in performance of ADLs  - Assess for home care needs following discharge   - Consider OT consult to assist with ADL evaluation and planning for discharge  - Provide patient education as appropriate  6/21/2021 1508 by Leyla De La Cruz RN  Outcome: Adequate for Discharge  6/21/2021 0948 by Leyla De La Cruz RN  Outcome: Progressing  Goal: Maintain proper alignment of affected body part  Description: INTERVENTIONS:  - Support, maintain and protect limb and body alignment  - Provide patient/ family with appropriate education  6/21/2021 1508 by Tom Bean RN  Outcome: Adequate for Discharge  6/21/2021 0948 by Tom Bean RN  Outcome: Progressing     Problem: Prexisting or High Potential for Compromised Skin Integrity  Goal: Skin integrity is maintained or improved  Description: INTERVENTIONS:  - Identify patients at risk for skin breakdown  - Assess and monitor skin integrity  - Assess and monitor nutrition and hydration status  - Monitor labs   - Assess for incontinence   - Turn and reposition patient  - Assist with mobility/ambulation  - Relieve pressure over bony prominences  - Avoid friction and shearing  - Provide appropriate hygiene as needed including keeping skin clean and dry  - Evaluate need for skin moisturizer/barrier cream  - Collaborate with interdisciplinary team   - Patient/family teaching  - Consider wound care consult   6/21/2021 1508 by Tom Bean RN  Outcome: Adequate for Discharge  6/21/2021 0948 by Tom Bean RN  Outcome: Progressing     Problem: SAFETY,RESTRAINT: NV/NON-SELF DESTRUCTIVE BEHAVIOR  Goal: Remains free of harm/injury (restraint for non violent/non self-detsructive behavior)  Description: INTERVENTIONS:  - Instruct patient/family regarding restraint use   - Assess and monitor physiologic and psychological status   - Provide interventions and comfort measures to meet assessed patient needs   - Identify and implement measures to help patient regain control  - Assess readiness for release of restraint   6/21/2021 1508 by Tom Bean RN  Outcome: Adequate for Discharge  6/21/2021 0948 by Tom Bean RN  Outcome: Progressing  Goal: Returns to optimal restraint-free functioning  Description: INTERVENTIONS:  - Assess the patient's behavior and symptoms that indicate continued need for restraint  - Identify and implement measures to help patient regain control  - Assess readiness for release of restraint   6/21/2021 1508 by Genie Herrera RN  Outcome: Adequate for Discharge  6/21/2021 2134 by Genie Herrera RN  Outcome: Progressing

## 2021-06-21 NOTE — PLAN OF CARE
Problem: Potential for Falls  Goal: Patient will remain free of falls  Description: INTERVENTIONS:  - Educate patient/family on patient safety including physical limitations  - Instruct patient to call for assistance with activity   - Consult OT/PT to assist with strengthening/mobility   - Keep Call bell within reach  - Keep bed low and locked with side rails adjusted as appropriate  - Keep care items and personal belongings within reach  - Initiate and maintain comfort rounds  - Make Fall Risk Sign visible to staff  - Offer Toileting every  Hours, in advance of need  - Initiate/Maintain alarm  - Obtain necessary fall risk management equipment  - Apply yellow socks and bracelet for high fall risk patients  - Consider moving patient to room near nurses station  Outcome: Progressing     Problem: NEUROSENSORY - ADULT  Goal: Achieves stable or improved neurological status  Description: INTERVENTIONS  - Monitor and report changes in neurological status  - Monitor vital signs such as temperature, blood pressure, glucose, and any other labs ordered   - Initiate measures to prevent increased intracranial pressure  - Monitor for seizure activity and implement precautions if appropriate      Outcome: Progressing  Goal: Achieves maximal functionality and self care  Description: INTERVENTIONS  - Monitor swallowing and airway patency with patient fatigue and changes in neurological status  - Encourage and assist patient to increase activity and self care     - Encourage visually impaired, hearing impaired and aphasic patients to use assistive/communication devices  Outcome: Progressing     Problem: METABOLIC, FLUID AND ELECTROLYTES - ADULT  Goal: Electrolytes maintained within normal limits  Description: INTERVENTIONS:  - Monitor labs and assess patient for signs and symptoms of electrolyte imbalances  - Administer electrolyte replacement as ordered  - Monitor response to electrolyte replacements, including repeat lab results as appropriate  - Instruct patient on fluid and nutrition as appropriate  Outcome: Progressing  Goal: Fluid balance maintained  Description: INTERVENTIONS:  - Monitor labs   - Monitor I/O and WT  - Instruct patient on fluid and nutrition as appropriate  - Assess for signs & symptoms of volume excess or deficit  Outcome: Progressing     Problem: MUSCULOSKELETAL - ADULT  Goal: Maintain or return mobility to safest level of function  Description: INTERVENTIONS:  - Assess patient's ability to carry out ADLs; assess patient's baseline for ADL function and identify physical deficits which impact ability to perform ADLs (bathing, care of mouth/teeth, toileting, grooming, dressing, etc )  - Assess/evaluate cause of self-care deficits   - Assess range of motion  - Assess patient's mobility  - Assess patient's need for assistive devices and provide as appropriate  - Encourage maximum independence but intervene and supervise when necessary  - Involve family in performance of ADLs  - Assess for home care needs following discharge   - Consider OT consult to assist with ADL evaluation and planning for discharge  - Provide patient education as appropriate  Outcome: Progressing  Goal: Maintain proper alignment of affected body part  Description: INTERVENTIONS:  - Support, maintain and protect limb and body alignment  - Provide patient/ family with appropriate education  Outcome: Progressing     Problem: Prexisting or High Potential for Compromised Skin Integrity  Goal: Skin integrity is maintained or improved  Description: INTERVENTIONS:  - Identify patients at risk for skin breakdown  - Assess and monitor skin integrity  - Assess and monitor nutrition and hydration status  - Monitor labs   - Assess for incontinence   - Turn and reposition patient  - Assist with mobility/ambulation  - Relieve pressure over bony prominences  - Avoid friction and shearing  - Provide appropriate hygiene as needed including keeping skin clean and dry  - Evaluate need for skin moisturizer/barrier cream  - Collaborate with interdisciplinary team   - Patient/family teaching  - Consider wound care consult   Outcome: Progressing     Problem: SAFETY,RESTRAINT: NV/NON-SELF DESTRUCTIVE BEHAVIOR  Goal: Remains free of harm/injury (restraint for non violent/non self-detsructive behavior)  Description: INTERVENTIONS:  - Instruct patient/family regarding restraint use   - Assess and monitor physiologic and psychological status   - Provide interventions and comfort measures to meet assessed patient needs   - Identify and implement measures to help patient regain control  - Assess readiness for release of restraint   Outcome: Progressing  Goal: Returns to optimal restraint-free functioning  Description: INTERVENTIONS:  - Assess the patient's behavior and symptoms that indicate continued need for restraint  - Identify and implement measures to help patient regain control  - Assess readiness for release of restraint   Outcome: Progressing

## 2021-06-21 NOTE — DISCHARGE SUMMARY
* SIRS (systemic inflammatory response syndrome) [facility])  Assessment & Plan  82year old male with past medical history of Alzheimer's dementia, hypertension, COVID-19 infection, hyperlipidemia, was found by   Julianna Chaves stated that patient had wonder off form his house  On further evaluation patient noted with leukocytosis, lactic acidosis, tachycardia, tachypnea without clear source of infection  Currently heart rate better, blood pressure stable  No clear source of infection noted  Lactic acidosis now resolved after hydration  Troponin slightly elevated 0 8 x 2  Initiated heparin drip and subsequently discontinued by cardiology  No angina symptoms  Currently patient is stable, denies chest pain, dyspnea  (nursing staff helped with interpretation)  Currently patient is hemodynamically stable  Continue supportive care  Maintained delirium precaution  Continue to monitor off antibiotics  Blood culture negative x2  C diff negative  Stool bacteria panel negative  Encourage oral hydration  Family is agreeable above plans  Elevated troponin  Assessment & Plan  Noted with troponin 0 08 x 2  Chest x-ray report noted negative for acute hemorrhage  Currently patient is IV heparin drip but elevated troponin less likely to be cardiac in origin  Will discontinue heparin drip  Follow-up with 2D echo  Follow-up with cardiology recommendation  History of COVID-19  Assessment & Plan  Patient has history of COVID-19 infection approximately 2 months ago  CT PE study report noted negative for pulmonary embolism, noted with atelectasis  Continue supportive care  Alzheimer's dementia [facility])  Assessment & Plan  Present admission history of Alzheimer's dementia  Resume home dose of Aricept, risperidone, Seroquel  Maintain delirium precaution    - Consider olanzapine 10 mg PRN for acute episodes    HTN (hypertension)  Assessment & Plan  Present on admission history of hypertension    Currently controlled  Resume home dose of Norvasc, hydrochlorothiazide, lisinopril with holding parameters  Discharging Physician / Practitioner: Bam Mata MD  PCP: Jessa Shell PA-C  Admission Date:   Admission Orders (From admission, onward)     Ordered        06/19/21 0611  Place in Observation  Once                   Discharge Date: 06/21/21    Medical Problems     Resolved Problems  Date Reviewed: 6/19/2021    None                  Consultations During Hospital Stay:  · Cardiology    Procedures Performed:   · N/A    Significant Findings / Test Results:   · None    Incidental Findings:   · None     Test Results Pending at Discharge (will require follow up): · Stool ova and parasites     Outpatient Tests Requested:  · N/A    Complications:  None    Reason for Admission: SIRS    Hospital Course:     Moy Grant is a 82 y o  male patient who originally presented to the hospital on 1/84/3384 by police due to sitting outside the police office on the grass, confusion and diaphoresis  The patient has a past medical history of HTN, DM2 and Alzheimer's dementia  In the ED the patient was found to be tachycardic and hypertensive  In addition, the patient could not answer any questions 2/2 dementia and language barrier  EKG revealed sinus rhythm but could not rule out inferior wall MI  Troponin level was measured and revealed elevation x2 then normal 3rd measurement  In addition, CMP revealed an KYLER, CBC revealed leukocytosis and lactic acid was elevated  The patient was treated with IV fluids and monitored on telemetry  Elevated creatinine was resolved after IV fluid  On hospital day 1 the BMP and CBC revealed resolution of KYLER and reduction in leukocytosis, respectively  The patient had a large watery bowel movement and stool and ova, stool cultures, blood cultures and C  Difficile PCR was ordered   Patient was placed on contact precautions and strict hand hygiene protocol  In addition, the patient became agitated and was placed on lap restraint then 1 to 1  C difficile PCR testing was negative and contact precaution was discontinued  Stool bacteria panel negative  Patient's grandson reports patient normal BM was 2-3 times per day  He uses to take bathroom more frequent which is patient's baseline  Encouraged oral hydration  On hospital day 2, the patient had no further episodes of agitation but 1 to 1 was continued  Lap restraint was discontinued as were fluids since the patient had good PO intake  On the day of discharge, the patient is stable and had no further episodes of diarrhea  Please see above list of diagnoses and related plan for additional information  Condition at Discharge: good     Discharge Day Visit / Exam:     Subjective: The patient was seen and examined at the bedside this morning  There were no acute events overnight  The patient does not endorse any abdominal pain, chest pain, nausea or vomiting  ROS was limited by the patient's limited English speaking ability  However, the patient was in good spirits and answered questions appropriately  Vitals: Blood Pressure: 135/85 (06/21/21 0714)  Pulse: 85 (06/21/21 0933)  Temperature: 97 5 °F (36 4 °C) (06/21/21 0933)  Temp Source: Oral (06/21/21 0933)  Respirations: 20 (06/20/21 1854)  SpO2: 94 % (06/21/21 0933)     Exam:   Physical Exam  Constitutional:       General: He is not in acute distress  Appearance: Normal appearance  He is not ill-appearing or toxic-appearing  HENT:      Head: Normocephalic and atraumatic  Mouth/Throat:      Mouth: Mucous membranes are moist       Pharynx: Oropharynx is clear  Eyes:      Extraocular Movements: Extraocular movements intact  Cardiovascular:      Rate and Rhythm: Normal rate and regular rhythm  Pulses: Normal pulses  Heart sounds: Normal heart sounds  No murmur heard  No friction rub  No gallop      Pulmonary:      Effort: Pulmonary effort is normal  No respiratory distress  Breath sounds: Normal breath sounds  No wheezing, rhonchi or rales  Abdominal:      General: Abdomen is flat  Bowel sounds are normal  There is no distension  Palpations: Abdomen is soft  There is no mass  Tenderness: There is no abdominal tenderness  There is no guarding  Musculoskeletal:         General: Normal range of motion  Skin:     General: Skin is warm and dry  Neurological:      General: No focal deficit present  Mental Status: He is alert  Psychiatric:         Mood and Affect: Mood normal          Discussion with Family:  Patient's grandson Monica Ashley was notified of the patient's condition  Discharge instructions/Information to patient and family:   See after visit summary for information provided to patient and family  Provisions for Follow-Up Care:  See after visit summary for information related to follow-up care and any pertinent home health orders  Disposition:     Home    For Discharges to [facility]F:   · Not Applicable to this Patient - Not Applicable to this Patient    Planned Readmission: None     Discharge Statement:  I spent 30 minutes discharging the patient  This time was spent on the day of discharge  I had direct contact with the patient on the day of discharge  Greater than 50% of the total time was spent examining patient, answering all patient questions, arranging and discussing plan of care with patient as well as directly providing post-discharge instructions  Additional time then spent on discharge activities  Discharge Medications:  See after visit summary for reconciled discharge medications provided to patient and family        ** Please Note: This note has been constructed using a voice recognition system **

## 2021-06-21 NOTE — DISCHARGE INSTR - AVS FIRST PAGE
DISCHARGE INSTRUCTIONS   1  Follow up with your primary care physician in one week  in regards to recent hospitalization  Follow up with your urologist Dr Damian Reyes for prostate enlargement  2  Take medications regularly as prescribed before  No change in your home medications  No new medication was given  You can take over the counter probiotics for your chronic frequent loose bowel movements  We encourage to drink enough water  Stay hydrated  3  Come back to the ER if symptoms recur or worsen  4  Activity as tolerated  5  Diet : Regular diet

## 2021-06-21 NOTE — PLAN OF CARE
Problem: Potential for Falls  Goal: Patient will remain free of falls  Description: INTERVENTIONS:  - Educate patient/family on patient safety including physical limitations  - Instruct patient to call for assistance with activity   - Consult OT/PT to assist with strengthening/mobility   - Keep Call bell within reach  - Keep bed low and locked with side rails adjusted as appropriate  - Keep care items and personal belongings within reach  - Initiate and maintain comfort rounds  - Make Fall Risk Sign visible to staff  - Offer Toileting every 2 Hours, in advance of need  - Initiate/Maintain bed/chair alarm  - Obtain necessary fall risk management equipment: yellow bracelet and socks   - Apply yellow socks and bracelet for high fall risk patients  - Consider moving patient to room near nurses station  Outcome: Not Progressing     Problem: NEUROSENSORY - ADULT  Goal: Achieves stable or improved neurological status  Description: INTERVENTIONS  - Monitor and report changes in neurological status  - Monitor vital signs such as temperature, blood pressure, glucose, and any other labs ordered   - Initiate measures to prevent increased intracranial pressure  - Monitor for seizure activity and implement precautions if appropriate      Outcome: Not Progressing  Goal: Achieves maximal functionality and self care  Description: INTERVENTIONS  - Monitor swallowing and airway patency with patient fatigue and changes in neurological status  - Encourage and assist patient to increase activity and self care     - Encourage visually impaired, hearing impaired and aphasic patients to use assistive/communication devices  Outcome: Not Progressing     Problem: METABOLIC, FLUID AND ELECTROLYTES - ADULT  Goal: Electrolytes maintained within normal limits  Description: INTERVENTIONS:  - Monitor labs and assess patient for signs and symptoms of electrolyte imbalances  - Administer electrolyte replacement as ordered  - Monitor response to electrolyte replacements, including repeat lab results as appropriate  - Instruct patient on fluid and nutrition as appropriate  Outcome: Not Progressing  Goal: Fluid balance maintained  Description: INTERVENTIONS:  - Monitor labs   - Monitor I/O and WT  - Instruct patient on fluid and nutrition as appropriate  - Assess for signs & symptoms of volume excess or deficit  Outcome: Not Progressing     Problem: MUSCULOSKELETAL - ADULT  Goal: Maintain or return mobility to safest level of function  Description: INTERVENTIONS:  - Assess patient's ability to carry out ADLs; assess patient's baseline for ADL function and identify physical deficits which impact ability to perform ADLs (bathing, care of mouth/teeth, toileting, grooming, dressing, etc )  - Assess/evaluate cause of self-care deficits   - Assess range of motion  - Assess patient's mobility  - Assess patient's need for assistive devices and provide as appropriate  - Encourage maximum independence but intervene and supervise when necessary  - Involve family in performance of ADLs  - Assess for home care needs following discharge   - Consider OT consult to assist with ADL evaluation and planning for discharge  - Provide patient education as appropriate  Outcome: Not Progressing  Goal: Maintain proper alignment of affected body part  Description: INTERVENTIONS:  - Support, maintain and protect limb and body alignment  - Provide patient/ family with appropriate education  Outcome: Not Progressing     Problem: Prexisting or High Potential for Compromised Skin Integrity  Goal: Skin integrity is maintained or improved  Description: INTERVENTIONS:  - Identify patients at risk for skin breakdown  - Assess and monitor skin integrity  - Assess and monitor nutrition and hydration status  - Monitor labs   - Assess for incontinence   - Turn and reposition patient  - Assist with mobility/ambulation  - Relieve pressure over bony prominences  - Avoid friction and shearing  - Provide appropriate hygiene as needed including keeping skin clean and dry  - Evaluate need for skin moisturizer/barrier cream  - Collaborate with interdisciplinary team   - Patient/family teaching  - Consider wound care consult   Outcome: Not Progressing     Problem: SAFETY,RESTRAINT: NV/NON-SELF DESTRUCTIVE BEHAVIOR  Goal: Remains free of harm/injury (restraint for non violent/non self-detsructive behavior)  Description: INTERVENTIONS:  - Instruct patient/family regarding restraint use   - Assess and monitor physiologic and psychological status   - Provide interventions and comfort measures to meet assessed patient needs   - Identify and implement measures to help patient regain control  - Assess readiness for release of restraint   Outcome: Not Progressing  Goal: Returns to optimal restraint-free functioning  Description: INTERVENTIONS:  - Assess the patient's behavior and symptoms that indicate continued need for restraint  - Identify and implement measures to help patient regain control  - Assess readiness for release of restraint   Outcome: Not Progressing

## 2021-06-24 LAB
BACTERIA BLD CULT: NORMAL
BACTERIA BLD CULT: NORMAL
O+P STL CONC: NORMAL

## 2021-08-15 PROBLEM — N40.1 BENIGN LOCALIZED PROSTATIC HYPERPLASIA WITH LOWER URINARY TRACT SYMPTOMS (LUTS): Status: ACTIVE | Noted: 2021-08-15

## 2021-08-15 NOTE — PROGRESS NOTES
Problem List Items Addressed This Visit        Nervous and Auditory    Alzheimer's dementia (HCC)    Relevant Medications    QUEtiapine (SEROquel) 25 mg tablet       Genitourinary    Prostate cancer (HCC)    Relevant Orders    PSA Total, Diagnostic    Benign localized prostatic hyperplasia with lower urinary tract symptoms (LUTS) - Primary    Relevant Medications    tamsulosin (FLOMAX) 0 4 mg    Other Relevant Orders    POCT Measure PVR (Completed)       Other    Chest pain            Discussion:    Patient presents today with his daughter, he has a history of prostate cancer diagnosed on a procedure on the prostate many years ago, presumably a Transurethral resection of prostate  Has been taking tamsulosin which is helping him to urinate, he is interested in finasteride, discussed the mechanism of action with him and with his daughter today, this has been sent to the pharmacy  We will obtain a PSA today, and in 6 months  He is 82years old with decreasing performance status due to Alzheimer's dementia  I would have a very high threshold for imaging workup and potential for hormone therapy  If his PSA is increased to a large degree I would start with a CT scan and bone scan, should this show metastatic disease we would treat him with Firmagon followed by Lupron  If it is relatively stable I would check this on a every 6 months basis and obtain PSA over those visits to track his disease process  I favor a less is more approach in this gentleman given his Alzheimer's and decreasing performance status    Assessment and plan:       Please see problem oriented charting for the assessment plan of today's urological complaints      Poli Del Rio MD      Chief Complaint     Chief Complaint   Patient presents with    Transfer of Care     History of Prostate Cancer         History of Present Illness     Mannie Bond is a 82 y o    Gentleman with a reported history of prostate cancer diagnosed incidentally on    PSA 12 7 as of 6/2020    Per previous notes patient has been on surveillance of his prostate cancer, however there is made mention history of a radical prostatectomy in 2006  This appears to be incorrect  Given that he has a prostate on exam today, roughly 80 grams on the palpable portion, slightly firm, but no gross masses  It is likely that he had a transurethral resection of prostate which is sometimes referred to as a transurethral prostatectomy in some medical records  ECOG functional status is currently 1, he is having worsening cognitive status due to his Alzheimer's disease  He is having some troubles urinating, he is interested in maximal medical therapy with the addition of finasteride  He has no complaints today as he is mostly nonverbal, he is able to follow some commands, but mostly stares at us with a blank stare during today's visit  The following portions of the patient's history were reviewed and updated as appropriate: allergies, current medications, past family history, past medical history, past social history, past surgical history and problem list          Detailed Urologic History     - please refer to HPI    Review of Systems     Review of Systems   Constitutional: Negative  HENT: Negative  Eyes: Negative  Respiratory: Negative  Cardiovascular: Negative  Gastrointestinal: Negative  Endocrine: Negative  Genitourinary: Negative  Musculoskeletal: Negative  Skin: Negative  Allergic/Immunologic: Negative  Neurological: Negative  Hematological: Negative  Psychiatric/Behavioral: Negative  Allergies     No Known Allergies    Physical Exam     Physical Exam  Vitals reviewed  Constitutional:       General: He is not in acute distress  Appearance: Normal appearance  He is not ill-appearing, toxic-appearing or diaphoretic  HENT:      Head: Normocephalic and atraumatic  Eyes:      General: No scleral icterus  Right eye: No discharge  Left eye: No discharge  Cardiovascular:      Pulses: Normal pulses  Pulmonary:      Effort: Pulmonary effort is normal    Abdominal:      General: There is no distension  Palpations: There is no mass  Tenderness: There is no abdominal tenderness  Hernia: No hernia is present  Genitourinary:     Comments: Uncircumcised, normal Stanley stage, prostate is 80 grams, smooth, slightly firm, no gross nodules  Musculoskeletal:         General: No swelling  Skin:     General: Skin is warm  Neurological:      General: No focal deficit present  Mental Status: He is alert and oriented to person, place, and time  Cranial Nerves: No cranial nerve deficit  Psychiatric:         Mood and Affect: Mood normal          Behavior: Behavior normal          Thought Content:  Thought content normal          Judgment: Judgment normal              Vital Signs  Vitals:    08/18/21 0909   BP: 128/96   Pulse: 80   Weight: 71 2 kg (157 lb)   Height: 5' 2" (1 575 m)         Current Medications       Current Outpatient Medications:     amLODIPine (NORVASC) 10 mg tablet, Take 10 mg by mouth daily, Disp: , Rfl:     aspirin (ECOTRIN LOW STRENGTH) 81 mg EC tablet, Take 81 mg by mouth daily, Disp: , Rfl:     Cholecalciferol (VITAMIN D3) 2000 units TABS, Take 2,000 Units by mouth daily, Disp: , Rfl:     hydrochlorothiazide (HYDRODIURIL) 25 mg tablet, Take 25 mg by mouth daily, Disp: , Rfl:     lisinopril (ZESTRIL) 20 mg tablet, Take 20 mg by mouth daily, Disp: , Rfl:     montelukast (SINGULAIR) 10 mg tablet, Take 1 tablet (10 mg total) by mouth daily at bedtime, Disp: 30 tablet, Rfl: 3    QUEtiapine (SEROquel) 25 mg tablet, Take 25 mg by mouth daily at bedtime, Disp: , Rfl:     risperiDONE (RisperDAL) 0 5 mg tablet, Take 0 5 mg by mouth 2 (two) times a day, Disp: , Rfl:     tamsulosin (FLOMAX) 0 4 mg, TAKE 1 CAPSULE BY MOUTH NIGHTLY, Disp: , Rfl:       Active Problems Patient Active Problem List   Diagnosis    Chest pain    [facility] ([facility] )    HTN (hypertension)    SIRS (systemic inflammatory response syndrome) ([facility] )    Alzheimer's dementia ([place] [address] )    History of COVID-19    Elevated troponin    Benign localized prostatic hyperplasia with lower urinary tract symptoms (LUTS)         Past Medical History     Past Medical History:   Diagnosis Date    Dementia ([place] [address] )     Diabetes mellitus ([place] [address] )     Hypertension     Prostate [facility])          Surgical History     History reviewed  No pertinent surgical history  Family History     History reviewed  No pertinent family history        Social History     Social History     Social History     Tobacco Use   Smoking Status Never Smoker   Smokeless Tobacco Never Used         Pertinent Lab Values     Lab Results   Component Value Date    CREATININE 1 21 06/21/2021       No results found for: PSA    No recent PSA for my review      Pertinent Imaging      no recent imaging for my review

## 2021-08-15 NOTE — PATIENT INSTRUCTIONS
Cáncer de próstata   LO QUE NECESITA SABER:   ¿Qué necesito saber acerca del cáncer de próstata? La próstata es la glándula sexual masculina que ayuda a la producción del semen  Envuelve la uretra  La uretra es el tubo que transporta la orina de la vejiga al final del pene  En la [employer], el cáncer de próstata se desarrolla lentamente  El cáncer de próstata aumenta el riesgo de trombosis venosa profunda y embolia pulmonar  ¿Qué aumenta mi riesgo de cáncer de próstata? · Ser mayor de 48 años    · Padre o simran con cáncer de próstata    · Cambios en los genes    ¿Cuáles son los signos y síntomas del cáncer de próstata? Es posible que usted no presente síntomas elenita las etapas tempranas  En las últimas etapas, usted podría presentar alguno de los siguientes:  · Dificultad para empezar o dejar de orinar    · Sentir la necesidad de orinar con frecuencia, especialmente elenita la noche    · Sentir dolor o ardor al orinar o al eyacular semen    · Dificultad para tener arias erección    · [employer] en la orina o en el semen    · No poder orinar en lo absoluto    · Dolor o rigidez en la parte inferior de la espalda, las caderas o en la parte superior de los muslos    ¿Cómo se diagnostica el cáncer de próstata? · Un examen rectal digital (ERD) es un examen para revisar el tamaño y la forma de macias próstata  Macias médico introducirá un dedo con guante en macias recto para palpar si macias próstata está zachariah, firme o si tiene protuberancias  · Un examen antígeno prostático específico (APE) es un examen de serafin que controla los [place]  Estos niveles podrían aumentar si usted tiene cáncer de próstata  · Arias biopsia se [place] para miriam Teodoro Johnson de macias glándula prostática para examinarla en busca de cáncer  La muestra también podría ayudar a los médicos a determinar la etapa de macias cáncer  ¿Cómo se trata el cáncer de próstata?  Si usted está en la etapa temprana del cáncer, macias médico podría recomendarle exámenes frecuentes y citas de seguimiento regulares para vigilar si hay cambios  Los siguientes tratamientos pueden utilizarse solos o en conjunto, según el tipo y el estadio del cáncer de próstata que tenga:  · La terapia hormonal es un medicamento que se [place] para disminuir los niveles de testosterona (hormona [place])  · La quimioterapia es un medicamento que se [place] para encoger o [address] células cancerosas  Suele administrarse por Lexi Faden  · La radioterapia [place] yesenia x de lucas energía para eliminar las células cancerosas  Usted podría recibir radioterapia desde fuera de macias cuerpo o de pequeños gránulos o varillas dentro de la próstata  · La cirugía puede necesitarse dependiendo de la etapa del cáncer  Es posible que le extraigan parte o la totalidad de macias próstata  Usted también podría necesitar que le extraigan algunos ganglios linfáticos  [place] podría ayudar a evitar que el cáncer se propague a otras partes del cuerpo  Macias médico podría recomendarle arias combinación de radioterapia y [place]  ¿Qué puedo hacer para manejar mi cáncer de próstata? · No fume  La nicotina puede dañar vasos sanguíneos y dificultarle el control de macias cáncer de próstata  El fumar aumenta macias riesgo de presentar un nuevo cáncer o que el cáncer reaparezca y demorar el tiempo de recuperación después del [place]  No use cigarrillos electrónicos o tabaco sin humo en vez de cigarrillos o para tratar de dejar de fumar  Todos estos aún contienen nicotina  Pida a macias médico información si usted fuma actualmente y [place] para dejar de hacerlo  · Limite o no consuma bebidas alcohólicas, según indicaciones  Arias bebida equivale a 12 onzas de cerveza, 1½ onzas de licor o 5 onzas de vino  · Consuma alimentos saludables y variados  Los alimentos saludables incluyen frutas, verduras, pan integral, productos lácteos bajos en grasa, frijoles, alan magras y pescado   Macias médico también podría recomendarle cambios en la cantidad de calcio y vitamina D que usted consume cada día  · Controle macias peso  La obesidad podría aumentar el riesgo de cáncer de próstata  Limite o no consuma alimentos o bebidas altos en calorías  · Ejercítese según indicaciones  El ejercicio podría ayudarlo a recuperarse después del tratamiento y podría ayudarlo a evitar que regrese macias cáncer de próstata  También puede ayudarle a controlar macias peso  Trate de hacer ejercicio por lo menos 30 minutos 5 días por semana, ellie por ejemplo caminar  · Pregunte sobre la actividad sexual  Ace Ihsan a macias médico cuándo puede volver a llevar a cabo la actividad sexual sin peligro después del tratamiento  Le pueden recetar medicamentos en sheryl que tenga dificultad para tener o mantener arias erección  · Controle la incontinencia  Es posible que presente incontinencia (dificultad para controlar las ganas de orinar) después del [place]  Pida a macias médico más información para lidiar con la incontinencia urinaria  Es posible que pueda volver a tener el control para orinar con técnicas o medicamentos  · [address] se le haya indicado  Pregunte cuánto líquido debe miriam cada día y cuáles líquidos son los más adecuados para usted  [place] más líquidos para evitar la deshidratación  Usted también tendrá que reemplazar líquidos si vomita o tiene diarrea debido a los tratamientos del cáncer  Llame al Temi Lianne de emergencias local (911 en los [place]) si:  · Macias pierna se siente cálida, sensible y adolorida  Se podría grant inflamado y campuzano  · Si de repente siente un desvanecimiento y falta de aire  · Si le duele el pecho cuando respira hondo o tose  · Usted expectora serafin  ¿Cuándo janiya buscar atención inmediata? · No puede orinar u orina muy poco y ha tomado suficientes líquidos  · Tiene dolor de vejiga  · Tiene dolor en la parte baja de la espalda, dolor en las piernas o ardor u hormigueo en los pies      · Tiene debilidad en las piernas o problemas para caminar  ¿Cuándo janiya llamar a mi médico?  · Tiene fiebre  · Usted siente que no puede hacerle frente a macias enfermedad  · Usted tiene dolor que no disminuye ni desaparece aun después de miriam macias medicamento  · Usted tiene preguntas o inquietudes acerca de macias condición o cuidado  ACUERDOS SOBRE MACIAS CUIDADO:   Usted tiene el derecho de ayudar a planear macias cuidado  Aprenda todo lo que pueda sobre macias condición y ellie darle tratamiento  Discuta carlene opciones de tratamiento con carlene médicos para decidir el cuidado que usted desea recibir  Usted siempre tiene el derecho de rechazar el tratamiento  Esta información es sólo para uso en educación  Macias intención no es darle un consejo médico sobre enfermedades o tratamientos  Colsulte con macias Murlene Lusty farmacéutico antes de seguir cualquier régimen médico para saber si es seguro y efectivo para usted  © Copyright [facility] 2021 Information is for End User's use only and may not be sold, redistributed or otherwise used for commercial purposes   All illustrations and images included in CareNotes® are the copyrighted property of A D A M , Inc  or [address] [place] [address]

## 2021-08-18 ENCOUNTER — OFFICE VISIT (OUTPATIENT)
Dept: UROLOGY | Facility: CLINIC | Age: 86
End: 2021-08-18
Payer: COMMERCIAL

## 2021-08-18 VITALS
HEIGHT: 62 IN | WEIGHT: 157 LBS | HEART RATE: 80 BPM | BODY MASS INDEX: 28.89 KG/M2 | DIASTOLIC BLOOD PRESSURE: 96 MMHG | SYSTOLIC BLOOD PRESSURE: 128 MMHG

## 2021-08-18 DIAGNOSIS — G30.9 ALZHEIMER'S DEMENTIA WITH BEHAVIORAL DISTURBANCE, UNSPECIFIED TIMING OF DEMENTIA ONSET (HCC): ICD-10-CM

## 2021-08-18 DIAGNOSIS — F02.81 ALZHEIMER'S DEMENTIA WITH BEHAVIORAL DISTURBANCE, UNSPECIFIED TIMING OF DEMENTIA ONSET (HCC): ICD-10-CM

## 2021-08-18 DIAGNOSIS — R07.9 CHEST PAIN, UNSPECIFIED TYPE: ICD-10-CM

## 2021-08-18 DIAGNOSIS — N40.1 BENIGN LOCALIZED PROSTATIC HYPERPLASIA WITH LOWER URINARY TRACT SYMPTOMS (LUTS): Primary | ICD-10-CM

## 2021-08-18 DIAGNOSIS — C61 PROSTATE CANCER (HCC): ICD-10-CM

## 2021-08-18 LAB — POST-VOID RESIDUAL VOLUME, ML POC: 14 ML

## 2021-08-18 PROCEDURE — 51798 US URINE CAPACITY MEASURE: CPT | Performed by: UROLOGY

## 2021-08-18 PROCEDURE — 99204 OFFICE O/P NEW MOD 45 MIN: CPT | Performed by: UROLOGY

## 2021-08-18 RX ORDER — FINASTERIDE 5 MG/1
5 TABLET, FILM COATED ORAL DAILY
Qty: 90 TABLET | Refills: 3 | Status: SHIPPED | OUTPATIENT
Start: 2021-08-18 | End: 2022-04-04

## 2021-08-18 RX ORDER — TAMSULOSIN HYDROCHLORIDE 0.4 MG/1
CAPSULE ORAL
COMMUNITY
Start: 2021-06-16 | End: 2021-09-16 | Stop reason: SDUPTHER

## 2021-08-18 RX ORDER — QUETIAPINE FUMARATE 25 MG/1
25 TABLET, FILM COATED ORAL
COMMUNITY

## 2021-09-13 ENCOUNTER — TELEPHONE (OUTPATIENT)
Dept: OTHER | Facility: OTHER | Age: 86
End: 2021-09-13

## 2021-09-13 LAB — PSA SERPL-MCNC: 11.9 NG/ML (ref 0–4)

## 2021-09-16 ENCOUNTER — TELEPHONE (OUTPATIENT)
Dept: UROLOGY | Facility: CLINIC | Age: 86
End: 2021-09-16

## 2021-09-16 DIAGNOSIS — N40.1 BENIGN LOCALIZED PROSTATIC HYPERPLASIA WITH LOWER URINARY TRACT SYMPTOMS (LUTS): Primary | ICD-10-CM

## 2021-09-16 RX ORDER — TAMSULOSIN HYDROCHLORIDE 0.4 MG/1
0.4 CAPSULE ORAL
Qty: 90 CAPSULE | Refills: 3 | Status: SHIPPED | OUTPATIENT
Start: 2021-09-16 | End: 2022-04-04

## 2021-09-16 NOTE — TELEPHONE ENCOUNTER
Not sure what I'm supposed to do with this form as it lists Dr Honorio Davies as prescriber with signature line under his name   Please let me know

## 2021-10-04 DIAGNOSIS — R05.3 CHRONIC COUGH: ICD-10-CM

## 2021-10-06 RX ORDER — MONTELUKAST SODIUM 10 MG/1
TABLET ORAL
Qty: 30 TABLET | Refills: 0 | Status: SHIPPED | OUTPATIENT
Start: 2021-10-06

## 2022-02-15 ENCOUNTER — TELEPHONE (OUTPATIENT)
Dept: INTERNAL MEDICINE CLINIC | Facility: CLINIC | Age: 87
End: 2022-02-15

## 2022-03-10 ENCOUNTER — TELEPHONE (OUTPATIENT)
Dept: UROLOGY | Facility: CLINIC | Age: 87
End: 2022-03-10

## 2022-03-10 NOTE — TELEPHONE ENCOUNTER
THE [facility] asking for CB so patient can RS appointment since there is no PSA done  Office number provided  Please assist in [address] [place] [address] appointment if/when they call back  Thank you

## 2022-04-01 ENCOUNTER — APPOINTMENT (OUTPATIENT)
Dept: LAB | Facility: HOSPITAL | Age: 87
End: 2022-04-01
Payer: COMMERCIAL

## 2022-04-01 ENCOUNTER — TELEPHONE (OUTPATIENT)
Dept: UROLOGY | Facility: CLINIC | Age: 87
End: 2022-04-01

## 2022-04-01 DIAGNOSIS — C61 PROSTATE CANCER (HCC): ICD-10-CM

## 2022-04-01 LAB — PSA SERPL-MCNC: 10.2 NG/ML (ref 0–4)

## 2022-04-01 PROCEDURE — 84153 ASSAY OF PSA TOTAL: CPT

## 2022-04-01 NOTE — TELEPHONE ENCOUNTER
Department of aging called to confirm patient had appointment today  Patient was rescheduled for Monday 04/04/22 due to not having psa done

## 2022-04-04 ENCOUNTER — OFFICE VISIT (OUTPATIENT)
Dept: UROLOGY | Facility: CLINIC | Age: 87
End: 2022-04-04
Payer: COMMERCIAL

## 2022-04-04 VITALS
BODY MASS INDEX: 29.44 KG/M2 | DIASTOLIC BLOOD PRESSURE: 96 MMHG | WEIGHT: 160 LBS | HEIGHT: 62 IN | SYSTOLIC BLOOD PRESSURE: 150 MMHG

## 2022-04-04 DIAGNOSIS — E11.59 HYPERTENSION ASSOCIATED WITH TYPE 2 DIABETES MELLITUS (HCC): ICD-10-CM

## 2022-04-04 DIAGNOSIS — C61 PROSTATE CANCER (HCC): ICD-10-CM

## 2022-04-04 DIAGNOSIS — N40.1 BENIGN LOCALIZED PROSTATIC HYPERPLASIA WITH LOWER URINARY TRACT SYMPTOMS (LUTS): ICD-10-CM

## 2022-04-04 DIAGNOSIS — I15.2 HYPERTENSION ASSOCIATED WITH TYPE 2 DIABETES MELLITUS (HCC): ICD-10-CM

## 2022-04-04 DIAGNOSIS — R07.9 CHEST PAIN, UNSPECIFIED TYPE: Primary | ICD-10-CM

## 2022-04-04 DIAGNOSIS — N18.31 STAGE 3A CHRONIC KIDNEY DISEASE (HCC): ICD-10-CM

## 2022-04-04 PROCEDURE — 99213 OFFICE O/P EST LOW 20 MIN: CPT | Performed by: PHYSICIAN ASSISTANT

## 2022-04-04 RX ORDER — FINASTERIDE 5 MG/1
5 TABLET, FILM COATED ORAL DAILY
Qty: 90 TABLET | Refills: 3 | Status: SHIPPED | OUTPATIENT
Start: 2022-04-04 | End: 2022-05-04 | Stop reason: SDUPTHER

## 2022-04-04 RX ORDER — TAMSULOSIN HYDROCHLORIDE 0.4 MG/1
0.4 CAPSULE ORAL
Qty: 90 CAPSULE | Refills: 3 | Status: SHIPPED | OUTPATIENT
Start: 2022-04-04 | End: 2022-05-04 | Stop reason: SDUPTHER

## 2022-04-04 NOTE — PROGRESS NOTES
4/4/2022      Chief Complaint   Patient presents with    Benign Prostatic Hypertrophy         Assessment and Plan    82 y o  male -- Dr Dick Mcfadden    1  Prostate cancer  - patient with decreasing performance status due to Alzheimer's dementia  - discussed plan with in depth at previous appointment with attending showing high threshold for imaging workup and potential for hormone therapy  It was discussed that if his PSA should rise to a large degree, CT scan and bone scan would be the next step  Should the imaging findings show metastatic disease, treatment would begin with Cherry Elissa followed by Lupron  As long as PSA is relatively stable, this will be checked on an every 6 month basis  - Most recent PSA 20 4 (when corrected for finasteride), previously 11 9  - Discussed recommendations for CT scan and bone scan at this time  Should this show metastatic prostate cancer, would recommend initiation of ADT in the form of Firmagon followed by Lupron  Should this be negative, will continue to monitor PSA  - return to review  - call with any questions or concerns in the meantime  - All questions answered; patient understands and agrees with plan          History of Present [address] is a 82 y o  male patient of Dr Dick Mcfadden with history of prostate cancer and Alzheimer's dementia here for follow up  Patient is Indonesian-speaking only and  was used for today's visit  Patient has a history of prostate cancer that was incidentally diagnosed on what sounds like transurethral resection of the prostate  Patient previously saw attending to establish care with our practice  Due to decreasing performance status due to Alzheimer's dementia, plan was discussed to have a high threshold for imaging workup in potential hormone therapy  Most recent PSA 20 4 when corrected for finasteride, previously 11 9    Patient was started on finasteride therapy at last visit and has been doing well symptom castelan     Denies any new or worsening symptoms today  Denies gross hematuria, dysuria, fever, chills, nausea, vomiting, weight loss, bone pain  Review of Systems   Constitutional: Negative for activity change, appetite change, chills and fever  HENT: Negative for congestion and trouble swallowing  Respiratory: Negative for cough and shortness of breath  Cardiovascular: Negative for chest pain, palpitations and leg swelling  Gastrointestinal: Negative for abdominal pain, constipation, diarrhea, nausea and vomiting  Genitourinary: Negative for difficulty urinating, dysuria, flank pain, frequency, hematuria and urgency  Musculoskeletal: Negative for back pain and gait problem  Skin: Negative for wound  Allergic/Immunologic: Negative for immunocompromised state  Neurological: Negative for dizziness and syncope  Hematological: Does not bruise/bleed easily  Psychiatric/Behavioral: Negative for confusion  All other systems reviewed and are negative  Vitals  Vitals:    04/04/22 1404   BP: 150/96   BP Location: Left arm   Patient Position: Sitting   Cuff Size: Adult   Weight: 72 6 kg (160 lb)   Height: 5' 2" (1 575 m)       Physical Exam  Constitutional:       General: He is not in acute distress  Appearance: Normal appearance  He is not ill-appearing, toxic-appearing or diaphoretic  HENT:      Head: Normocephalic  Nose: No congestion  Eyes:      General: No scleral icterus  Right eye: No discharge  Left eye: No discharge  Conjunctiva/sclera: Conjunctivae normal       Pupils: Pupils are equal, round, and reactive to light  Pulmonary:      Effort: Pulmonary effort is normal    Musculoskeletal:      Cervical back: Normal range of motion  Skin:     General: Skin is warm and dry  Coloration: Skin is not jaundiced or pale  Findings: No bruising, erythema, lesion or rash  Neurological:      General: No focal deficit present        Mental Status: He is alert and oriented to person, place, and time  Mental status is at baseline  Gait: Gait normal    Psychiatric:         Mood and Affect: Mood normal          Behavior: Behavior normal          Thought Content: Thought content normal          Judgment: Judgment normal            Past History  Past Medical History:   Diagnosis Date    Dementia ([facility] )     Diabetes mellitus ([facility] )     Hypertension     Prostate CA ([place] [id number] )      Social History     Socioeconomic History    Marital status: /Civil Union     Spouse name: None    Number of children: None    Years of education: None    Highest education level: None   Occupational History    None   Tobacco Use    Smoking status: Never Smoker    Smokeless tobacco: Never Used   Vaping Use    Vaping Use: Never used   Substance and Sexual Activity    Alcohol use: Never    Drug use: Never    Sexual activity: Never   Other Topics Concern    None   Social History Narrative    None     Social Determinants of Health     Financial Resource Strain: Not on file   Food Insecurity: Not on file   Transportation Needs: Not on file   Physical Activity: Not on file   Stress: Not on file   Social Connections: Not on file   Intimate Partner Violence: Not on file   Housing Stability: Not on file     Social History     Tobacco Use   Smoking Status Never Smoker   Smokeless Tobacco Never Used     History reviewed  No pertinent family history      The following portions of the patient's history were reviewed and updated as appropriate: allergies, current medications, past medical history, past social history, past surgical history and problem list     Results  No results found for this or any previous visit (from the past 1 hour(s)) ]  Lab Results   Component Value Date    PSA 10 2 (H) 04/01/2022    PSA 11 9 (H) 09/10/2021     Lab Results   Component Value Date    CALCIUM 9 3 06/21/2021    K 3 4 (L) 06/21/2021    CO2 26 06/21/2021     06/21/2021    BUN 21 06/21/2021    CREATININE 1 21 06/21/2021     Lab Results   Component Value Date    WBC 11 60 (H) 06/21/2021    HGB 12 1 06/21/2021    HCT 38 5 06/21/2021    MCV 80 (L) 06/21/2021     06/21/2021       Nuha Keith PA-C

## 2022-04-19 ENCOUNTER — HOSPITAL ENCOUNTER (OUTPATIENT)
Dept: NUCLEAR MEDICINE | Facility: HOSPITAL | Age: 87
Discharge: HOME | End: 2022-04-19
Payer: COMMERCIAL

## 2022-04-19 DIAGNOSIS — C61 PROSTATE CANCER (HCC): ICD-10-CM

## 2022-04-19 PROCEDURE — 78306 BONE IMAGING WHOLE BODY: CPT

## 2022-04-19 PROCEDURE — A9503 TC99M MEDRONATE: HCPCS

## 2022-04-26 ENCOUNTER — APPOINTMENT (OUTPATIENT)
Dept: LAB | Facility: HOSPITAL | Age: 87
End: 2022-04-26
Payer: COMMERCIAL

## 2022-04-26 DIAGNOSIS — C61 PROSTATE CANCER (HCC): ICD-10-CM

## 2022-04-26 LAB
ALBUMIN SERPL BCP-MCNC: 3.8 G/DL (ref 3.5–5)
ALP SERPL-CCNC: 89 U/L (ref 46–116)
ALT SERPL W P-5'-P-CCNC: 27 U/L (ref 12–78)
ANION GAP SERPL CALCULATED.3IONS-SCNC: 8 MMOL/L (ref 4–13)
AST SERPL W P-5'-P-CCNC: 26 U/L (ref 5–45)
BILIRUB SERPL-MCNC: 0.48 MG/DL (ref 0.2–1)
BUN SERPL-MCNC: 18 MG/DL (ref 5–25)
CALCIUM SERPL-MCNC: 9.3 MG/DL (ref 8.3–10.1)
CHLORIDE SERPL-SCNC: 107 MMOL/L (ref 100–108)
CO2 SERPL-SCNC: 27 MMOL/L (ref 21–32)
CREAT SERPL-MCNC: 1.2 MG/DL (ref 0.6–1.3)
GFR SERPL CREATININE-BSD FRML MDRD: 54 ML/MIN/1.73SQ M
GLUCOSE P FAST SERPL-MCNC: 90 MG/DL (ref 65–99)
POTASSIUM SERPL-SCNC: 4 MMOL/L (ref 3.5–5.3)
PROT SERPL-MCNC: 7.8 G/DL (ref 6.4–8.2)
PSA SERPL-MCNC: 9.4 NG/ML (ref 0–4)
SODIUM SERPL-SCNC: 142 MMOL/L (ref 136–145)

## 2022-04-26 PROCEDURE — 36415 COLL VENOUS BLD VENIPUNCTURE: CPT

## 2022-04-26 PROCEDURE — 84153 ASSAY OF PSA TOTAL: CPT

## 2022-04-26 PROCEDURE — 80053 COMPREHEN METABOLIC PANEL: CPT

## 2022-04-27 ENCOUNTER — HOSPITAL ENCOUNTER (OUTPATIENT)
Dept: CT IMAGING | Facility: CLINIC | Age: 87
Discharge: HOME | End: 2022-04-27
Payer: COMMERCIAL

## 2022-04-27 DIAGNOSIS — C61 PROSTATE CANCER (HCC): ICD-10-CM

## 2022-04-27 PROCEDURE — 71260 CT THORAX DX C+: CPT

## 2022-04-27 PROCEDURE — 74177 CT ABD & PELVIS W/CONTRAST: CPT

## 2022-04-27 RX ADMIN — IOHEXOL 100 ML: 350 INJECTION, SOLUTION INTRAVENOUS at 11:14

## 2022-05-04 ENCOUNTER — OFFICE VISIT (OUTPATIENT)
Dept: UROLOGY | Facility: CLINIC | Age: 87
End: 2022-05-04
Payer: COMMERCIAL

## 2022-05-04 VITALS
HEIGHT: 62 IN | HEART RATE: 80 BPM | DIASTOLIC BLOOD PRESSURE: 80 MMHG | SYSTOLIC BLOOD PRESSURE: 122 MMHG | BODY MASS INDEX: 29.63 KG/M2 | WEIGHT: 161 LBS | OXYGEN SATURATION: 98 %

## 2022-05-04 DIAGNOSIS — C61 PROSTATE CANCER (HCC): ICD-10-CM

## 2022-05-04 DIAGNOSIS — N40.1 BENIGN LOCALIZED PROSTATIC HYPERPLASIA WITH LOWER URINARY TRACT SYMPTOMS (LUTS): Primary | ICD-10-CM

## 2022-05-04 LAB
POST-VOID RESIDUAL VOLUME, ML POC: 20 ML
SL AMB  POCT GLUCOSE, UA: NORMAL
SL AMB LEUKOCYTE ESTERASE,UA: NORMAL
SL AMB POCT BILIRUBIN,UA: NORMAL
SL AMB POCT BLOOD,UA: NORMAL
SL AMB POCT CLARITY,UA: CLEAR
SL AMB POCT COLOR,UA: YELLOW
SL AMB POCT KETONES,UA: NORMAL
SL AMB POCT NITRITE,UA: NORMAL
SL AMB POCT PH,UA: 6
SL AMB POCT SPECIFIC GRAVITY,UA: 1.01
SL AMB POCT URINE PROTEIN: NORMAL
SL AMB POCT UROBILINOGEN: 0.2

## 2022-05-04 PROCEDURE — 81002 URINALYSIS NONAUTO W/O SCOPE: CPT | Performed by: PHYSICIAN ASSISTANT

## 2022-05-04 PROCEDURE — 51798 US URINE CAPACITY MEASURE: CPT | Performed by: PHYSICIAN ASSISTANT

## 2022-05-04 PROCEDURE — 99213 OFFICE O/P EST LOW 20 MIN: CPT | Performed by: PHYSICIAN ASSISTANT

## 2022-05-04 RX ORDER — TAMSULOSIN HYDROCHLORIDE 0.4 MG/1
0.4 CAPSULE ORAL
Qty: 90 CAPSULE | Refills: 3 | Status: SHIPPED | OUTPATIENT
Start: 2022-05-04

## 2022-05-04 RX ORDER — FINASTERIDE 5 MG/1
5 TABLET, FILM COATED ORAL DAILY
Qty: 90 TABLET | Refills: 3 | Status: SHIPPED | OUTPATIENT
Start: 2022-05-04 | End: 2022-07-29

## 2022-05-04 NOTE — PROGRESS NOTES
5/4/2022      No chief complaint on file  Assessment and Plan    82 y o  male -- Dr Abdi Hernandez    1  Prostate cancer  - DAVE today stable  - PET scan and CT showing no evidence of bone mets  - PSA 18 8 when corrected for finasteride, previously 20 4 when corrected for finasteride   - Will continue active surveillance  - PSA in 6 months  - Call with any questions or concerns  - All questions answered; patient understands and agrees with plan    History of Present Illness  Eric Sanderson is a 82 y o  male patient of Dr Abdi Hernandez with history of prostate cancer on active survillance here for follow up   was used for today's visit  Patient had large rise in PSA and imaging with CT and PET scan was performed  Patient had no evidence of bone metastasis and imaging was stable  Doing well overall  No new or worsening symptoms  PSA now 18 8 when corrected for finasteride, previously 20 4 when corrected for finasteride  Review of Systems   Constitutional: Negative for activity change, appetite change, chills and fever  HENT: Negative for congestion and trouble swallowing  Respiratory: Negative for cough and shortness of breath  Cardiovascular: Negative for chest pain, palpitations and leg swelling  Gastrointestinal: Negative for abdominal pain, constipation, diarrhea, nausea and vomiting  Genitourinary: Negative for difficulty urinating, dysuria, flank pain, frequency, hematuria and urgency  Musculoskeletal: Negative for back pain and gait problem  Skin: Negative for wound  Allergic/Immunologic: Negative for immunocompromised state  Neurological: Negative for dizziness and syncope  Hematological: Does not bruise/bleed easily  Psychiatric/Behavioral: Negative for confusion  All other systems reviewed and are negative  Vitals  There were no vitals filed for this visit  Physical Exam  Constitutional:       General: He is not in acute distress       Appearance: Normal appearance  He is not ill-appearing, toxic-appearing or diaphoretic  HENT:      Head: Normocephalic  Nose: No congestion  Eyes:      General: No scleral icterus  Right eye: No discharge  Left eye: No discharge  Conjunctiva/sclera: Conjunctivae normal       Pupils: Pupils are equal, round, and reactive to light  Pulmonary:      Effort: Pulmonary effort is normal    Genitourinary:     Comments: Prostate 80 g, smooth, slightly firm, no nodules  Musculoskeletal:      Cervical back: Normal range of motion  Skin:     General: Skin is warm and dry  Coloration: Skin is not jaundiced or pale  Findings: No bruising, erythema, lesion or rash  Neurological:      General: No focal deficit present  Mental Status: He is alert and oriented to person, place, and time  Mental status is at baseline  Gait: Gait normal    Psychiatric:         Mood and Affect: Mood normal          Behavior: Behavior normal          Thought Content:  Thought content normal          Judgment: Judgment normal            Past History  Past Medical History:   Diagnosis Date    Dementia ([facility] )     Diabetes mellitus ([facility] )     Hypertension     Prostate CA ([facility] )      Social History     Socioeconomic History    Marital status: /Civil Union     Spouse name: Not on file    Number of children: Not on file    Years of education: Not on file    Highest education level: Not on file   Occupational History    Not on file   Tobacco Use    Smoking status: Never Smoker    Smokeless tobacco: Never Used   Vaping Use    Vaping Use: Never used   Substance and Sexual Activity    Alcohol use: Never    Drug use: Never    Sexual activity: Never   Other Topics Concern    Not on file   Social History Narrative    Not on file     Social Determinants of Health     Financial Resource Strain: Not on file   Food Insecurity: Not on file   Transportation Needs: Not on file   Physical Activity: Not on file   Stress: Not on file   Social Connections: Not on file   Intimate Partner Violence: Not on file   Housing Stability: Not on file     Social History     Tobacco Use   Smoking Status Never Smoker   Smokeless Tobacco Never Used     No family history on file      The following portions of the patient's history were reviewed and updated as appropriate: allergies, current medications, past medical history, past social history, past surgical history and problem list     Results  No results found for this or any previous visit (from the past 1 hour(s)) ]  Lab Results   Component Value Date    PSA 9 4 (H) 04/26/2022    PSA 10 2 (H) 04/01/2022    PSA 11 9 (H) 09/10/2021     Lab Results   Component Value Date    CALCIUM 9 3 04/26/2022    K 4 0 04/26/2022    CO2 27 04/26/2022     04/26/2022    BUN 18 04/26/2022    CREATININE 1 20 04/26/2022     Lab Results   Component Value Date    WBC 11 60 (H) 06/21/2021    HGB 12 1 06/21/2021    HCT 38 5 06/21/2021    MCV 80 (L) 06/21/2021     06/21/2021       Daniela Colmenares PA-C

## 2022-07-28 DIAGNOSIS — N40.1 BENIGN LOCALIZED PROSTATIC HYPERPLASIA WITH LOWER URINARY TRACT SYMPTOMS (LUTS): ICD-10-CM

## 2022-07-29 RX ORDER — FINASTERIDE 5 MG/1
TABLET, FILM COATED ORAL
Qty: 90 TABLET | Refills: 3 | Status: SHIPPED | OUTPATIENT
Start: 2022-07-29

## 2022-12-02 ENCOUNTER — OFFICE VISIT (OUTPATIENT)
Dept: UROLOGY | Facility: CLINIC | Age: 87
End: 2022-12-02

## 2022-12-02 VITALS
OXYGEN SATURATION: 99 % | WEIGHT: 150 LBS | DIASTOLIC BLOOD PRESSURE: 80 MMHG | HEART RATE: 85 BPM | HEIGHT: 62 IN | SYSTOLIC BLOOD PRESSURE: 126 MMHG | BODY MASS INDEX: 27.6 KG/M2

## 2022-12-02 DIAGNOSIS — C61 PROSTATE CANCER (HCC): Primary | ICD-10-CM

## 2022-12-02 NOTE — PROGRESS NOTES
12/2/2022      Chief Complaint   Patient presents with   • Follow-up         Assessment and Plan    82 y o  male     1  Prostate cancer  - DAVE May 2022 stable  - PET scan and CT showing no evidence of bone mets  - PSA not done prior to today's visit  - Will continue active surveillance  - PSA now and again in 6 months  - Call with any questions or concerns  - All questions answered; patient understands and agrees with plan    History of Present [address] is a 82 y o  male patient with history of prostate cancer on active survillance here for follow up  Family member present for interpretation for today's visit  Patient had large rise in PSA and imaging with CT and PET scan was performed  Patient had no evidence of bone metastasis and imaging was stable  Doing well overall  No new or worsening symptoms  PSA most recently 18 8 when corrected for finasteride, previously 20 4 when corrected for finasteride  Updated PSA was not done prior to today's visit  Denies new or worsening symptoms today  Review of Systems   Constitutional: Negative for activity change, appetite change, chills and fever  HENT: Negative for congestion and trouble swallowing  Respiratory: Negative for cough and shortness of breath  Cardiovascular: Negative for chest pain, palpitations and leg swelling  Gastrointestinal: Negative for abdominal pain, constipation, diarrhea, nausea and vomiting  Genitourinary: Negative for difficulty urinating, dysuria, flank pain, frequency, hematuria and urgency  Musculoskeletal: Negative for back pain and gait problem  Skin: Negative for wound  Allergic/Immunologic: Negative for immunocompromised state  Neurological: Negative for dizziness and syncope  Hematological: Does not bruise/bleed easily  Psychiatric/Behavioral: Negative for confusion  All other systems reviewed and are negative        Vitals  Vitals:    12/02/22 1458   BP: 126/80   BP Location: Left arm Patient Position: Sitting   Cuff Size: Adult   Pulse: 85   SpO2: 99%   Weight: 68 kg (150 lb)   Height: 5' 2" (1 575 m)       Physical Exam  Constitutional:       General: He is not in acute distress  Appearance: Normal appearance  He is not ill-appearing, toxic-appearing or diaphoretic  HENT:      Head: Normocephalic  Nose: No congestion  Eyes:      General: No scleral icterus  Right eye: No discharge  Left eye: No discharge  Conjunctiva/sclera: Conjunctivae normal       Pupils: Pupils are equal, round, and reactive to light  Pulmonary:      Effort: Pulmonary effort is normal    Genitourinary:     Comments: Prostate 80 g, smooth, slightly firm, no nodules  Musculoskeletal:      Cervical back: Normal range of motion  Skin:     General: Skin is warm and dry  Coloration: Skin is not jaundiced or pale  Findings: No bruising, erythema, lesion or rash  Neurological:      General: No focal deficit present  Mental Status: He is alert and oriented to person, place, and time  Mental status is at baseline  Gait: Gait normal    Psychiatric:         Mood and Affect: Mood normal          Behavior: Behavior normal          Thought Content:  Thought content normal          Judgment: Judgment normal            Past History  Past Medical History:   Diagnosis Date   • Dementia ([facility] )    • Diabetes mellitus ([facility] )    • Hypertension    • Prostate CA ([facility] )      Social History     Socioeconomic History   • Marital status: /Civil Union     Spouse name: None   • Number of children: None   • Years of education: None   • Highest education level: None   Occupational History   • None   Tobacco Use   • Smoking status: Never   • Smokeless tobacco: Never   Vaping Use   • Vaping Use: Never used   Substance and Sexual Activity   • Alcohol use: Never   • Drug use: Never   • Sexual activity: Never   Other Topics Concern   • None   Social History Narrative   • None     Social Determinants of Health     Financial Resource Strain: Not on file   Food Insecurity: Not on file   Transportation Needs: Not on file   Physical Activity: Not on file   Stress: Not on file   Social Connections: Not on file   Intimate Partner Violence: Not on file   Housing Stability: Not on file     Social History     Tobacco Use   Smoking Status Never   Smokeless Tobacco Never     History reviewed  No pertinent family history      The following portions of the patient's history were reviewed and updated as appropriate: allergies, current medications, past medical history, past social history, past surgical history and problem list     Results  No results found for this or any previous visit (from the past 1 hour(s)) ]  Lab Results   Component Value Date    PSA 9 4 (H) 04/26/2022    PSA 10 2 (H) 04/01/2022    PSA 11 9 (H) 09/10/2021     Lab Results   Component Value Date    CALCIUM 9 3 04/26/2022    K 4 0 04/26/2022    CO2 27 04/26/2022     04/26/2022    BUN 18 04/26/2022    CREATININE 1 20 04/26/2022     Lab Results   Component Value Date    WBC 11 60 (H) 06/21/2021    HGB 12 1 06/21/2021    HCT 38 5 06/21/2021    MCV 80 (L) 06/21/2021     06/21/2021       Mari Messina

## 2023-01-26 NOTE — ED NOTES
Daughter states she did call urology today & she is waiting call back       Ronel Barreto, RN  07/25/18 4793
[facility]  07/25/18 0031
Per ED assessment, "27 y/o single male, homeless, unemployed, past psychiatric hx pf Schizophrenia/SAD, PTSD, Cannabis abuse, no prior SA, no violence, no medical issues was BIB/Self due to increasing paranoia. Patient reports that he is "depressed" and "emotions are bad" and that he is "lagging" and "breaking out" . Patient states that he feels more paranoid and feels "danger and eeriness". Reports that he is pacing and walking prior to presenting to the hospital"./Yes

## 2023-05-11 ENCOUNTER — APPOINTMENT (OUTPATIENT)
Dept: LAB | Facility: HOSPITAL | Age: 88
End: 2023-05-11

## 2023-05-11 DIAGNOSIS — C61 PROSTATE CANCER (HCC): ICD-10-CM

## 2023-05-12 LAB — PSA SERPL-MCNC: 12.5 NG/ML (ref 0–4)

## 2024-08-27 ENCOUNTER — OFFICE VISIT (OUTPATIENT)
Dept: URGENT CARE | Facility: CLINIC | Age: 89
End: 2024-08-27
Payer: MEDICARE

## 2024-08-27 VITALS
RESPIRATION RATE: 18 BRPM | SYSTOLIC BLOOD PRESSURE: 125 MMHG | TEMPERATURE: 98 F | OXYGEN SATURATION: 96 % | DIASTOLIC BLOOD PRESSURE: 78 MMHG | HEART RATE: 93 BPM

## 2024-08-27 DIAGNOSIS — M54.50 ACUTE BILATERAL LOW BACK PAIN WITHOUT SCIATICA: Primary | ICD-10-CM

## 2024-08-27 PROCEDURE — G0463 HOSPITAL OUTPT CLINIC VISIT: HCPCS

## 2024-08-27 PROCEDURE — 99203 OFFICE O/P NEW LOW 30 MIN: CPT

## 2024-08-27 RX ORDER — LIDOCAINE 50 MG/G
1 PATCH TOPICAL DAILY
Qty: 30 PATCH | Refills: 0 | Status: SHIPPED | OUTPATIENT
Start: 2024-08-27

## 2024-08-27 RX ORDER — CYCLOBENZAPRINE HCL 5 MG
5 TABLET ORAL 3 TIMES DAILY PRN
Qty: 21 TABLET | Refills: 0 | Status: SHIPPED | OUTPATIENT
Start: 2024-08-27

## 2024-08-27 NOTE — PROGRESS NOTES
[facility]        NAME: Dav Valenzuela is a 89 y.o. male  : 1935    MRN: [id number]  DATE: 2024  TIME: 5:07 PM    Assessment and Plan   Acute bilateral low back pain without sciatica [M54.50]  1. Acute bilateral low back pain without sciatica  cyclobenzaprine (FLEXERIL) 5 mg tablet    lidocaine (Lidoderm) 5 %        Bilateral lower lumbar back pain reproducible with slight palpation, bending, going from a standing to seated position.  No recent injury or fall.  No prior back trauma or surgeries.  Does have history of Alzheimer's dementia and prostate cancer.  Denies recent changes in urinary symptoms.  Equal sensation, strength, reflexes in bilateral lower extremities.  Follows commands well and answers questions appropriately.  Will give trial of low-dose of Flexeril as well as Lidoderm patches.  Informed patient and family to limit NSAID use due to cardiac history and stage IIIa CKD.  Advised to follow-up with family doctor if no improvement.  Advised to go to the ER if any symptoms worsen such as increasing pain, bowel/bladder incontinence, saddle anesthesia.    Patient Instructions     Take prescribed medication as instructed.  Muscle relaxer (Flexeril) will make you drowsy.  Only take as needed for tightness of muscles in the lower back.  Do not fall asleep with Lidoderm patches.  1 patch only 12 hours while awake.  Heating pad frequently throughout the day to the lower back.  Do not fall asleep with heating pad.  Make sure to stay well-hydrated.  Gentle stretching as discussed.  Follow-up with your family doctor if no improvement in symptoms.  Follow up with PCP in 3-5 days.  Proceed to  ER if symptoms worsen.    If tests are performed, our office will contact you with results only if changes need to made to the care plan discussed with you at the visit. [facility]    Chief Complaint     Chief Complaint   Patient presents with     "Back Pain     Lower back pain onset yesterday did  take motrin  for pain yesterday here with daughter who answers throught interp because apparently pt has  \"Alzheimers\"         History of Present Illness       89-year-old male here with family for bilateral low back pain made worse with bending, walking, going from standing to seated position.  Patient does have history of Alzheimer's dementia and prostate cancer.  He does follow-up with urology regularly.  Denies any recent acute changes in bowel/bladder habits.  No recent injury or fall.  Took ibuprofen yesterday which was helpful.  Denies any prior back history or surgeries.  Denies fever, chills, numbness, tingling, chest pain, shortness of breath.    Back Pain        Review of Systems   Review of Systems   Constitutional: Negative.    Respiratory: Negative.     Cardiovascular: Negative.    Genitourinary: Negative.    Musculoskeletal:  Positive for back pain.   Skin: Negative.    Neurological: Negative.          Current Medications       Current Outpatient Medications:     cyclobenzaprine (FLEXERIL) 5 mg tablet, Take 1 tablet (5 mg total) by mouth 3 (three) times a day as needed for muscle spasms, Disp: 21 tablet, Rfl: 0    lidocaine (Lidoderm) 5 %, Apply 1 patch topically over 12 hours daily Remove & Discard patch within 12 hours or as directed by MD, Disp: 30 patch, Rfl: 0    amLODIPine (NORVASC) 10 mg tablet, Take 10 mg by mouth daily, Disp: , Rfl:     aspirin (ECOTRIN LOW STRENGTH) 81 mg EC tablet, Take 81 mg by mouth daily, Disp: , Rfl:     Cholecalciferol (VITAMIN D3) 2000 units TABS, Take 2,000 Units by mouth daily, Disp: , Rfl:     finasteride (PROSCAR) 5 mg tablet, TAKE 1 TABLET BY MOUTH  DAILY, Disp: 90 tablet, Rfl: 3    montelukast (SINGULAIR) 10 mg tablet, TAKE 1 TABLET BY MOUTH ONCE DAILY AT BEDTIME, Disp: 30 tablet, Rfl: 0    QUEtiapine (SEROquel) 25 mg tablet, Take 25 mg by mouth daily at bedtime, Disp: , Rfl:     tamsulosin (FLOMAX) 0.4 mg, Take " 1 capsule (0.4 mg total) by mouth daily with dinner, Disp: 90 capsule, Rfl: 3    Current Allergies     Allergies as of 08/27/2024    (No Known Allergies)            The following portions of the patient's history were reviewed and updated as appropriate: allergies, current medications, past family history, past medical history, past social history, past surgical history and problem list.     Past Medical History:   Diagnosis Date    Dementia (HCC)     Diabetes mellitus (HCC)     Hypertension     Prostate CA (HCC)        No past surgical history on file.    No family history on file.      Medications have been verified.        Objective   /78   Pulse 93   Temp 98 °F (36.7 °C)   Resp 18   SpO2 96%        Physical Exam     Physical Exam  Constitutional:       General: He is not in acute distress.     Appearance: Normal appearance. He is not ill-appearing, toxic-appearing or diaphoretic.   HENT:      Head: Normocephalic and atraumatic.      Mouth/Throat:      Mouth: Mucous membranes are moist.      Pharynx: Oropharynx is clear.   Eyes:      Extraocular Movements: Extraocular movements intact.      Pupils: Pupils are equal, round, and reactive to light.   Cardiovascular:      Rate and Rhythm: Normal rate and regular rhythm.      Pulses: Normal pulses.      Heart sounds: Normal heart sounds.   Pulmonary:      Effort: Pulmonary effort is normal. No respiratory distress.      Breath sounds: Normal breath sounds. No stridor. No wheezing, rhonchi or rales.   Abdominal:      Tenderness: There is no right CVA tenderness or left CVA tenderness.   Musculoskeletal:      Cervical back: Normal range of motion and neck supple. No rigidity or tenderness.      Thoracic back: Normal.      Lumbar back: Spasms and tenderness (Tenderness in bilateral lower lumbar region or SI joint.  Patient able to exacerbated with forward flexion, does have good range of motion.  Equal sensation and reflexes in bilateral lower extremities)  present. No swelling, edema, deformity or signs of trauma. Normal range of motion. Negative right straight leg raise test and negative left straight leg raise test.        Back:       Comments: Visible discomfort when going from standing to seated position and vice versa.   Skin:     General: Skin is warm.      Capillary Refill: Capillary refill takes less than 2 seconds.      Findings: No bruising, erythema or rash.   Neurological:      General: No focal deficit present.      Mental Status: He is alert.      Cranial Nerves: No cranial nerve deficit.      Sensory: No sensory deficit.      Motor: No weakness.      Coordination: Coordination normal.   Psychiatric:         Mood and Affect: Mood normal.         Behavior: Behavior normal.

## 2024-08-27 NOTE — PATIENT INSTRUCTIONS
"Take prescribed medication as instructed.  Muscle relaxer (Flexeril) will make you drowsy.  Only take as needed for tightness of muscles in the lower back.  Do not fall asleep with Lidoderm patches.  1 patch only 12 hours while awake.  Heating pad frequently throughout the day to the lower back.  Do not fall asleep with heating pad.  Make sure to stay well-hydrated.  Gentle stretching as discussed.  Follow-up with your family doctor if no improvement in symptoms.  Follow up with PCP in 3-5 days.  Proceed to  ER if symptoms worsen.    If tests are performed, our office will contact you with results only if changes need to made to the care plan discussed with you at the visit. You can review your full results on [facility].  Patient Education     Lumbago - Instrucciones para el lucas   Conceptos Básicos   Redactado por los médicos y editores de [employer]   ¿Qué son las instrucciones para el lucas? -- Las instrucciones para el lucas son información sobre cómo cuidarse después de recibir atención médica por un problema de serene.  ¿Qué es el lumbago? -- El lumbago es sentir dolor o molestia en la parte baja de la espalda. Muchas personas tienen lumbago en algún momento, y la mayoría de las veces mejora por sí solo. El dolor en la parte baja de la espalda puede deberse a muchas cosas. La mayoría de las veces, los médicos desconocen la causa exacta.  Un desgarro en un músculo puede causar dolor de espalda. Con frecuencia, esto es lo que ocurre cuando alguien dice que sintió un \"tirón\" en la espalda y se refiere al dolor que comienza súbitamente después de hacer arias actividad física, ellie levantar un objeto pesado o flexionar la espalda.  El dolor de espalda también puede aparecer si usted tiene (figura 1):   Discos dañados, protuberantes o lesionados   Artritis que afecta las articulaciones de la columna   Crecimientos óseos en las vértebras que comprimen los nervios cercanos   [place] “fractura por aplastamiento” debida a " "la osteoporosis (padecimiento que debilita los huesos)   Vértebras fuera de lugar   Estrechamiento del conducto raquídeo   Un tumor o arias infección (aunque es muy poco frecuente)  ¿Cómo puedo cuidarme en casa? -- Pregúntele al médico o enfermero qué debe hacer cuando vuelva a macias casa. Asegúrese de comprender exactamente lo que tiene que hacer para cuidarse. Franco preguntas si hay algo que no entiende.  También debe hacer lo siguiente:   Aplique calor en la espalda elenita períodos cortos, si ayuda con el dolor. Ponga arias almohadilla térmica (en la configuración de intensidad más baja) en macias espalda elenita 20 minutos a la vez varias veces al día. Nunca se vaya a dormir con la almohadilla térmica en la espalda.   Trate de mantenerse lo más activo posible sin causar demasiado dolor, si macias médico o enfermero le dijo que puede hacerlo. Si el dolor es fozia, posiblemente deba reposar 1 o 2 días. Sin embargo, es importante que vuelva a caminar y moverse lo antes posible. Trate de seguir haciendo carlene actividades diarias normales. [place] y muévase con cuidado elenita el día a medida que pueda.   Poco a poco comience a aumentar macias nivel de actividad a medida que pueda. Si algo hace que macias dolor regrese o empeore, deje de hacerlo y vuelva a hacer las actividades más fáciles que no le dolían.   Evite estar parado o sentado en la misma posición sin moverse elenita mucho tiempo Puede dormir con arias almohada debajo de las rodillas o entre estas si le rey el dolor.   [place] medicinas ellie ibuprofeno (ejemplos de marcas comerciales: Advil, Motrin) o naproxeno (gabe comercial: Aleve) para el dolor, si es necesario. Son medicinas antiinflamatorias no esteroides (\"RITESH\"). Podrían ser más efectivas que el paracetamol (acetaminofén) para el lumbago.   Hable con macias médico o enfermero antes de probar cualquiera de las siguientes opciones. Estos tratamientos podrían ayudar a que se sienta mejor elenita algún tiempo:   Manipulación de " "la columna - Es un tratamiento que realiza un quiropráctico, un fisioterapeuta u otro profesional al  o \"acomodar\" las articulaciones de macias espalda.   Acupuntura - Es un tratamiento que realiza un experto en medicina china tradicional, mediante el cual se insertan pequeñas agujas en la piel para bloquear las señales de dolor.   Terapia de masaje - El masajista manipula los músculos y partes blandas para disminuir la tensión muscular y aumentar la relajación.  ¿Qué atención de seguimiento necesito? -- Macias médico o enfermero le dirá si necesita programar arias consulta de seguimiento. Si es así, asegúrese de saber cuándo y adónde ir. El médico podría sugerir que consulte a un fisioterapeuta para aprender ejercicios que lo ayuden con el dolor de espalda.  ¿Cuándo janiya llamar al médico? -- Pida ayuda de emergencia de inmediato (en . . y [place], llame al 9-1-1) si:   No puede caminar, o no puede controlar los intestinos o la vejiga.   Tiene fiebre de 100.4 °F (38 °C) o más, escalofríos o sudores nocturnos.  Llame a macias médico para pedir consejo si:   Tiene adormecimiento, debilidad u hormigueo en las piernas.   El dolor empeora, incluso con medicinas y descanso.   Le sale un sarpullido.  Todos los artículos se actualizan a medida que se descubre nueva evidencia y culmina nuestro proceso de evaluación por homólogos   [place] artículo se recuperó de UpToDate el: May 15, 2024.  Artículo 101304 Versión [id number].es-419.1  Release: 32.4.3 - C32.134  © 2024 [employer]. Todos los derechos reservados.  figura 1: Anatomía de la espalda     En gisell dibujo se muestran las distintas partes de la espalda. El dolor en la espalda puede aparecer a causa de problemas en los músculos, ligamentos, discos, huesos (vértebras) o nervios.  Gráfico 80142 Versión 6.0  Exención de responsabilidad y uso de la información del consumidor   Descargo de responsabilidad: esta información generalizada es un resumen limitado de información sobre el " diagnóstico, el tratamiento y/o los medicamentos. No pretende ser exhaustiva y se debe utilizar ellie herramienta para ayudar al usuario a comprender y/o evaluar las posibles opciones de diagnóstico y tratamiento. No incluye toda la información sobre afecciones, tratamientos, medicamentos, efectos secundarios o riesgos puedan ser aplicables a un paciente específico. No tiene el propósito de servir ellie recomendación médica ni de sustituir la recomendación médica, el diagnóstico o el tratamiento de un profesional de atención médica que se base en el examen y la evaluación de igsell profesional de la serene respecto a las circunstancias específicas y únicas del paciente. Los pacientes deben hablar con un profesional de atención médica para obtener información completa sobre macias serene, cuestiones médicas y opciones de tratamiento, incluidos los riesgos o los beneficios relacionados con el uso de medicamentos. Esta información no certifica que los tratamientos o medicamentos darien seguros, eficaces o estén aprobados para tratar a un paciente específico. Up[employer] Inc. y carlene afiliados renuncian a cualquier garantía o responsabilidad relacionada con esta información o el uso de la misma.El uso de esta información está sujeto a las Condiciones de uso, disponibles en [email or web address]. 2024© UpToDate, Inc. y carlene afiliados y/o licenciantes. Todos los derechos reservados.  Copyright   © 2024 [employer]. Todos los derechos reservados.